# Patient Record
Sex: FEMALE | Race: WHITE | Employment: UNEMPLOYED | URBAN - METROPOLITAN AREA
[De-identification: names, ages, dates, MRNs, and addresses within clinical notes are randomized per-mention and may not be internally consistent; named-entity substitution may affect disease eponyms.]

---

## 2017-06-20 ENCOUNTER — OFFICE VISIT (OUTPATIENT)
Dept: FAMILY MEDICINE CLINIC | Age: 72
End: 2017-06-20

## 2017-06-20 VITALS
BODY MASS INDEX: 19.88 KG/M2 | DIASTOLIC BLOOD PRESSURE: 72 MMHG | HEART RATE: 60 BPM | WEIGHT: 115.8 LBS | SYSTOLIC BLOOD PRESSURE: 138 MMHG | OXYGEN SATURATION: 97 %

## 2017-06-20 DIAGNOSIS — L03.012 PARONYCHIA OF FINGER, LEFT: Primary | ICD-10-CM

## 2017-06-20 PROCEDURE — 99213 OFFICE O/P EST LOW 20 MIN: CPT | Performed by: PHYSICIAN ASSISTANT

## 2017-06-20 RX ORDER — CEPHALEXIN 500 MG/1
500 CAPSULE ORAL 4 TIMES DAILY
Qty: 28 CAPSULE | Refills: 0 | Status: SHIPPED | OUTPATIENT
Start: 2017-06-20 | End: 2017-06-27

## 2017-06-20 ASSESSMENT — ENCOUNTER SYMPTOMS: RESPIRATORY NEGATIVE: 1

## 2017-06-22 ENCOUNTER — TELEPHONE (OUTPATIENT)
Dept: FAMILY MEDICINE CLINIC | Age: 72
End: 2017-06-22

## 2017-06-23 ENCOUNTER — OFFICE VISIT (OUTPATIENT)
Dept: FAMILY MEDICINE CLINIC | Age: 72
End: 2017-06-23

## 2017-06-23 VITALS
SYSTOLIC BLOOD PRESSURE: 146 MMHG | OXYGEN SATURATION: 95 % | DIASTOLIC BLOOD PRESSURE: 80 MMHG | TEMPERATURE: 98 F | HEART RATE: 72 BPM

## 2017-06-23 DIAGNOSIS — L08.9 INFECTED FINGER: Primary | ICD-10-CM

## 2017-06-23 PROCEDURE — 99213 OFFICE O/P EST LOW 20 MIN: CPT | Performed by: INTERNAL MEDICINE

## 2017-06-23 ASSESSMENT — ENCOUNTER SYMPTOMS
BACK PAIN: 0
ALLERGIC/IMMUNOLOGIC NEGATIVE: 1
RESPIRATORY NEGATIVE: 1
COLOR CHANGE: 1

## 2017-06-26 ENCOUNTER — OFFICE VISIT (OUTPATIENT)
Dept: FAMILY MEDICINE CLINIC | Age: 72
End: 2017-06-26

## 2017-06-26 VITALS
SYSTOLIC BLOOD PRESSURE: 138 MMHG | HEART RATE: 61 BPM | RESPIRATION RATE: 18 BRPM | OXYGEN SATURATION: 95 % | BODY MASS INDEX: 18.61 KG/M2 | DIASTOLIC BLOOD PRESSURE: 70 MMHG | WEIGHT: 109 LBS | HEIGHT: 64 IN

## 2017-06-26 DIAGNOSIS — L08.9 INFECTED FINGER: Primary | ICD-10-CM

## 2017-06-26 PROCEDURE — 99213 OFFICE O/P EST LOW 20 MIN: CPT | Performed by: INTERNAL MEDICINE

## 2017-06-26 RX ORDER — LEVOTHYROXINE SODIUM 88 UG/1
TABLET ORAL
Qty: 90 TABLET | Refills: 1 | Status: SHIPPED | OUTPATIENT
Start: 2017-06-26 | End: 2017-12-21 | Stop reason: SDUPTHER

## 2017-06-26 RX ORDER — AMOXICILLIN AND CLAVULANATE POTASSIUM 875; 125 MG/1; MG/1
1 TABLET, FILM COATED ORAL 2 TIMES DAILY
Qty: 20 TABLET | Refills: 0 | Status: SHIPPED | OUTPATIENT
Start: 2017-06-26 | End: 2017-07-06

## 2017-06-26 ASSESSMENT — ENCOUNTER SYMPTOMS
RESPIRATORY NEGATIVE: 1
ALLERGIC/IMMUNOLOGIC NEGATIVE: 1
GASTROINTESTINAL NEGATIVE: 1

## 2017-06-28 ENCOUNTER — HOSPITAL ENCOUNTER (OUTPATIENT)
Dept: OCCUPATIONAL THERAPY | Age: 72
Discharge: HOME | End: 2017-06-30
Admitting: ORTHOPAEDIC SURGERY

## 2017-06-28 ENCOUNTER — OFFICE VISIT (OUTPATIENT)
Dept: ORTHOPEDIC SURGERY | Age: 72
End: 2017-06-28

## 2017-06-28 VITALS
SYSTOLIC BLOOD PRESSURE: 140 MMHG | BODY MASS INDEX: 21.09 KG/M2 | HEART RATE: 64 BPM | HEIGHT: 63 IN | DIASTOLIC BLOOD PRESSURE: 82 MMHG | WEIGHT: 119 LBS

## 2017-06-28 DIAGNOSIS — M79.645 PAIN OF FINGER OF LEFT HAND: Primary | ICD-10-CM

## 2017-06-28 DIAGNOSIS — L03.012 PARONYCHIA OF FINGER, LEFT: ICD-10-CM

## 2017-06-28 PROCEDURE — 73140 X-RAY EXAM OF FINGER(S): CPT | Performed by: ORTHOPAEDIC SURGERY

## 2017-06-28 PROCEDURE — 99203 OFFICE O/P NEW LOW 30 MIN: CPT | Performed by: ORTHOPAEDIC SURGERY

## 2017-06-28 RX ORDER — IBUPROFEN 600 MG/1
600 TABLET ORAL EVERY 6 HOURS PRN
COMMUNITY
End: 2022-01-04 | Stop reason: ALTCHOICE

## 2017-07-06 ENCOUNTER — OFFICE VISIT (OUTPATIENT)
Dept: ORTHOPEDIC SURGERY | Age: 72
End: 2017-07-06

## 2017-07-06 DIAGNOSIS — L08.9 INFECTED FINGER: Primary | ICD-10-CM

## 2017-07-06 PROCEDURE — 99212 OFFICE O/P EST SF 10 MIN: CPT | Performed by: ORTHOPAEDIC SURGERY

## 2017-07-07 ENCOUNTER — TELEPHONE (OUTPATIENT)
Dept: OTHER | Facility: CLINIC | Age: 72
End: 2017-07-07

## 2017-12-21 RX ORDER — LEVOTHYROXINE SODIUM 88 UG/1
TABLET ORAL
Qty: 90 TABLET | Refills: 1 | Status: SHIPPED | OUTPATIENT
Start: 2017-12-21 | End: 2018-06-16 | Stop reason: SDUPTHER

## 2018-06-16 ENCOUNTER — TELEPHONE (OUTPATIENT)
Dept: FAMILY MEDICINE CLINIC | Age: 73
End: 2018-06-16

## 2018-06-18 RX ORDER — LEVOTHYROXINE SODIUM 88 UG/1
88 TABLET ORAL DAILY
Qty: 90 TABLET | Refills: 0 | Status: SHIPPED | OUTPATIENT
Start: 2018-06-18 | End: 2018-09-13 | Stop reason: SDUPTHER

## 2018-07-10 ENCOUNTER — OFFICE VISIT (OUTPATIENT)
Dept: FAMILY MEDICINE CLINIC | Age: 73
End: 2018-07-10

## 2018-07-10 VITALS
SYSTOLIC BLOOD PRESSURE: 122 MMHG | OXYGEN SATURATION: 98 % | WEIGHT: 118.2 LBS | HEIGHT: 63 IN | HEART RATE: 68 BPM | DIASTOLIC BLOOD PRESSURE: 68 MMHG | BODY MASS INDEX: 20.94 KG/M2

## 2018-07-10 DIAGNOSIS — J30.1 CHRONIC SEASONAL ALLERGIC RHINITIS DUE TO POLLEN: ICD-10-CM

## 2018-07-10 DIAGNOSIS — M19.041 PRIMARY OSTEOARTHRITIS OF BOTH HANDS: ICD-10-CM

## 2018-07-10 DIAGNOSIS — M19.042 PRIMARY OSTEOARTHRITIS OF BOTH HANDS: ICD-10-CM

## 2018-07-10 DIAGNOSIS — E03.9 ACQUIRED HYPOTHYROIDISM: ICD-10-CM

## 2018-07-10 LAB
T4 FREE: 1.6 NG/DL (ref 0.9–1.8)
TSH SERPL DL<=0.05 MIU/L-ACNC: 0.19 UIU/ML (ref 0.27–4.2)

## 2018-07-10 PROCEDURE — 36415 COLL VENOUS BLD VENIPUNCTURE: CPT | Performed by: INTERNAL MEDICINE

## 2018-07-10 PROCEDURE — 99214 OFFICE O/P EST MOD 30 MIN: CPT | Performed by: INTERNAL MEDICINE

## 2018-07-10 ASSESSMENT — ENCOUNTER SYMPTOMS
ALLERGIC/IMMUNOLOGIC NEGATIVE: 1
GASTROINTESTINAL NEGATIVE: 1
RESPIRATORY NEGATIVE: 1

## 2018-07-10 ASSESSMENT — PATIENT HEALTH QUESTIONNAIRE - PHQ9
SUM OF ALL RESPONSES TO PHQ QUESTIONS 1-9: 0
1. LITTLE INTEREST OR PLEASURE IN DOING THINGS: 0
2. FEELING DOWN, DEPRESSED OR HOPELESS: 0
SUM OF ALL RESPONSES TO PHQ9 QUESTIONS 1 & 2: 0

## 2018-07-10 NOTE — PATIENT INSTRUCTIONS
All above problems reviewed and the found to be unchanged except for the following :     Acquired Hypothyroidism. Will do labs and adjust meds as needed. Call if new c/o. Seasonal Allergies. Claritin, Neti pot, Flonase as needed. Call if new c/o. Osteoarthritis. OTCs as needed. Continue to use hands and improve . Call if worsening pain. Medicare wellness visit in 6 mo.

## 2018-07-10 NOTE — ASSESSMENT & PLAN NOTE
No increased fatigue,wt loss/gain,hair loss, dry skin, depression, muscle pain, or heart palpatations. No change in med dose since last visit. No c/o w/ meds. On 88 mcg.

## 2018-07-17 RX ORDER — LEVOTHYROXINE SODIUM 0.07 MG/1
TABLET ORAL
Qty: 15 TABLET | Refills: 5 | Status: SHIPPED | OUTPATIENT
Start: 2018-07-17 | End: 2018-09-19 | Stop reason: SDUPTHER

## 2018-07-17 NOTE — TELEPHONE ENCOUNTER
Joanie King  Female, 67 y.o., 1945  Weight:   118 lb 3.2 oz (53.6 kg)  Phone:   [phone] (Home Phone) [phone] (Mobile)  Pref Phone:   None  PCP:   Melany Malhotra MD  Allergies  No Known Allergies  Health Maintenance:   Due  FYI  General  [facility]  MRN:   [id number]  MyChart: Active  Next Appt:   01/15/2019  Pt Comm Pref:   MyChart [2]            Associated Results     TSH without Reflex   Order: [id number]   Status:  Final result   Visible to patient:  Yes (MyChart) Next appt:  01/15/2019 at 10:00 AM in Family Medicine (Melany Malhotra MD) Dx:  Acquired hypothyroidism   Notes recorded by Anais Abernathy RN on 7/17/2018 at 8:13 AM EDT  Spoke with pt about her results/ needs the 75mcg sent to pharmacy and will re-check in 6 weeks  ------    Notes recorded by Lucina Green on 7/13/2018 at 10:24 AM EDT  LM for the pt to contact the office. ------    Notes recorded by Lucina Green on 7/12/2018 at 12:08 PM EDT  LM for the pt to contact the office. ------    Notes recorded by Melany Malhotra MD on 7/11/2018 at 9:17 PM EDT  Thyroid in upper level of normal. To alt 75/88 mcg for 6 weeks and rechx TSH and Free T4.      Ref Range & Units 7d ago 1yr ago 2yr ago 4yr ago    TSH 0.27 - 4.20 uIU/mL 0.19   0.77  1.62  1.48R    Narrative     Performed at:  [place] [address] [address] [facility] [address]   Phone [phone]      Specimen Collected: 07/10/18 09:00 Last Resulted: 07/10/18 18:52 Lab Flowsheet Order Details View Encounter Lab and Collection Details Routing Result History      R=Reference range differs from displayed range              T4, Free   Order: [id number]   Status:  Final result   Visible to patient:  Yes (MyChart) Next appt:  01/15/2019 at 10:00 AM in [address] [place] [address] (Melany Malhotra MD) Dx:  Acquired hypothyroidism   Notes recorded by Anais Abernathy RN on 7/17/2018 at 8:13 AM EDT  Spoke with pt about her results/ needs the [facility] sent to pharmacy and will re-check in 6 weeks  ------    Notes recorded by Arcelia Rico on 2018 at 10:24 AM EDT  LM for the pt to contact the office. ------    Notes recorded by Arcelia Rico on 2018 at 12:08 PM EDT  LM for the pt to contact the office. ------    Notes recorded by Obey Pozo MD on 2018 at 9:17 PM EDT  Thyroid in upper level of normal. To alt 75/88 mcg for 6 weeks and rechx TSH and Free T4.      Ref Range & Units 7d ago 2yr ago    T4 Free 0.9 - 1.8 ng/dL 1.6  1.3    Narrative     Performed at:  [address] [facility] [address]   Phone [phone]      Specimen Collected: 07/10/18 09:00 Last Resulted: 07/10/18 18:25 Lab Flowsheet Order Details View Encounter Lab and Collection Details Routing Result History               Routing History     Priority Sent On From To Message Type    2018  8:13 AM SOLE Ricks LPN Result Notes     11:17 AM Annette Rogers LPN [facility] [facility] Result Notes    2018  9:17 PM MD Annette Haji LPN Result Notes    1406  6:25 PM Otto, Swoh Incoming Results Sol Salazar MD Results   Future Appointments      Provider Maverick Blanca   1/15/2019 10:00 AM Obey Pzoo MD [address]   Patient Demographics     Patient Name  Elis Wang Sex  Female   1945 [facility]   Address  [address] [facility] Phone  [phone] (Home)  [phone] [facility])   Emergency Contact(s)     Name Relation Home Work Mobile   Alcides delcid Spouse [phone]   Alena Easley Child [phone]     PCP and Center     Primary Care Provider [address] [place] [address] [address]   Patient Employment     Status   Not Employed

## 2018-08-24 ENCOUNTER — TELEPHONE (OUTPATIENT)
Dept: FAMILY MEDICINE CLINIC | Age: 73
End: 2018-08-24

## 2018-08-24 DIAGNOSIS — E03.9 ACQUIRED HYPOTHYROIDISM: Primary | ICD-10-CM

## 2018-08-24 NOTE — TELEPHONE ENCOUNTER
Patient will be going to the [facility] on [address] for her repeat TSH blood work on Monday or Tuesday next week. Orders please.

## 2018-08-28 DIAGNOSIS — E03.9 ACQUIRED HYPOTHYROIDISM: ICD-10-CM

## 2018-08-28 LAB — TSH SERPL DL<=0.05 MIU/L-ACNC: 0.26 UIU/ML (ref 0.27–4.2)

## 2018-08-30 ENCOUNTER — TELEPHONE (OUTPATIENT)
Dept: FAMILY MEDICINE CLINIC | Age: 73
End: 2018-08-30

## 2018-08-30 DIAGNOSIS — E03.9 ACQUIRED HYPOTHYROIDISM: Primary | ICD-10-CM

## 2018-08-30 NOTE — TELEPHONE ENCOUNTER
----- Message from Pillo Prince sent at 8/30/2018 11:22 AM EDT -----  Patient returned call regarding TSH lab results. I read her the physician notes, she understood, no questions. She will go to the [facility] on [address] in 6 - 8 weeks for a recheck. Orders please.

## 2018-09-13 RX ORDER — LEVOTHYROXINE SODIUM 88 UG/1
88 TABLET ORAL DAILY
Qty: 90 TABLET | Refills: 1 | Status: SHIPPED | OUTPATIENT
Start: 2018-09-13 | End: 2018-09-19

## 2018-09-19 RX ORDER — LEVOTHYROXINE SODIUM 0.07 MG/1
75 TABLET ORAL DAILY
Qty: 30 TABLET | Refills: 0 | Status: SHIPPED | OUTPATIENT
Start: 2018-09-19 | End: 2018-09-24 | Stop reason: SDUPTHER

## 2018-09-19 NOTE — TELEPHONE ENCOUNTER
Patient requesting new Rx for new dose of Levothyroxine (75mcg once a day).     Cayla Ashton [phone] (home)    is requesting refill(s) of medication Levothyroxine 75mcg to preferred pharmacy [facility]    Last OV 7/10/18 (pertaining to medication)   Last refill 9/13/18 old dose (per medication requested)  Next office visit scheduled or attempted yes  Date 1/15/19  If No, reason     Call patient when done

## 2018-09-24 RX ORDER — LEVOTHYROXINE SODIUM 0.07 MG/1
75 TABLET ORAL DAILY
Qty: 30 TABLET | Refills: 5 | Status: SHIPPED | OUTPATIENT
Start: 2018-09-24 | End: 2019-03-19 | Stop reason: SDUPTHER

## 2018-09-24 NOTE — TELEPHONE ENCOUNTER
Patients  Chikis Saucedo called to let Dr. Sreekanth Hatch know that they need a new prescription for the Levothyroxine sent to [facility] on [address] stating that Mame Osorio is only to be taking the 75 MCG everyday. She should NOT alternate to 88 MCG every other day. Please send new prescription when available.

## 2018-10-22 DIAGNOSIS — E03.9 ACQUIRED HYPOTHYROIDISM: ICD-10-CM

## 2018-10-23 LAB — TSH SERPL DL<=0.05 MIU/L-ACNC: 0.66 UIU/ML (ref 0.27–4.2)

## 2019-01-15 ENCOUNTER — OFFICE VISIT (OUTPATIENT)
Dept: FAMILY MEDICINE CLINIC | Age: 74
End: 2019-01-15
Payer: MEDICARE

## 2019-01-15 VITALS
HEIGHT: 64 IN | RESPIRATION RATE: 16 BRPM | WEIGHT: 120 LBS | OXYGEN SATURATION: 95 % | DIASTOLIC BLOOD PRESSURE: 60 MMHG | BODY MASS INDEX: 20.49 KG/M2 | SYSTOLIC BLOOD PRESSURE: 120 MMHG | HEART RATE: 65 BPM

## 2019-01-15 DIAGNOSIS — Z00.00 ROUTINE GENERAL MEDICAL EXAMINATION AT A HEALTH CARE FACILITY: ICD-10-CM

## 2019-01-15 DIAGNOSIS — R53.83 FATIGUE, UNSPECIFIED TYPE: ICD-10-CM

## 2019-01-15 DIAGNOSIS — Z00.00 MEDICARE ANNUAL WELLNESS VISIT, SUBSEQUENT: Primary | ICD-10-CM

## 2019-01-15 DIAGNOSIS — E78.00 PURE HYPERCHOLESTEROLEMIA: ICD-10-CM

## 2019-01-15 DIAGNOSIS — M19.042 PRIMARY OSTEOARTHRITIS OF BOTH HANDS: ICD-10-CM

## 2019-01-15 DIAGNOSIS — M19.041 PRIMARY OSTEOARTHRITIS OF BOTH HANDS: ICD-10-CM

## 2019-01-15 DIAGNOSIS — E03.9 ACQUIRED HYPOTHYROIDISM: ICD-10-CM

## 2019-01-15 DIAGNOSIS — M85.89 OSTEOPENIA OF MULTIPLE SITES: ICD-10-CM

## 2019-01-15 LAB
ALBUMIN SERPL-MCNC: 4.6 G/DL (ref 3.4–5)
ALP BLD-CCNC: 57 U/L (ref 40–129)
ALT SERPL-CCNC: 21 U/L (ref 10–40)
ANION GAP SERPL CALCULATED.3IONS-SCNC: 12 MMOL/L (ref 3–16)
AST SERPL-CCNC: 21 U/L (ref 15–37)
BILIRUB SERPL-MCNC: 0.5 MG/DL (ref 0–1)
BILIRUBIN DIRECT: <0.2 MG/DL (ref 0–0.3)
BILIRUBIN, INDIRECT: NORMAL MG/DL (ref 0–1)
BUN BLDV-MCNC: 11 MG/DL (ref 7–20)
CALCIUM SERPL-MCNC: 9.5 MG/DL (ref 8.3–10.6)
CHLORIDE BLD-SCNC: 101 MMOL/L (ref 99–110)
CHOLESTEROL, TOTAL: 200 MG/DL (ref 0–199)
CO2: 27 MMOL/L (ref 21–32)
CREAT SERPL-MCNC: 0.5 MG/DL (ref 0.6–1.2)
GFR AFRICAN AMERICAN: >60
GFR NON-AFRICAN AMERICAN: >60
GLUCOSE BLD-MCNC: 89 MG/DL (ref 70–99)
HCT VFR BLD CALC: 43.8 % (ref 36–48)
HDLC SERPL-MCNC: 82 MG/DL (ref 40–60)
HEMOGLOBIN: 14.9 G/DL (ref 12–16)
LDL CHOLESTEROL CALCULATED: 107 MG/DL
MCH RBC QN AUTO: 33.7 PG (ref 26–34)
MCHC RBC AUTO-ENTMCNC: 33.9 G/DL (ref 31–36)
MCV RBC AUTO: 99.5 FL (ref 80–100)
PDW BLD-RTO: 13.4 % (ref 12.4–15.4)
PHOSPHORUS: 4.3 MG/DL (ref 2.5–4.9)
PLATELET # BLD: 336 K/UL (ref 135–450)
PMV BLD AUTO: 8.3 FL (ref 5–10.5)
POTASSIUM SERPL-SCNC: 4.3 MMOL/L (ref 3.5–5.1)
RBC # BLD: 4.41 M/UL (ref 4–5.2)
SODIUM BLD-SCNC: 140 MMOL/L (ref 136–145)
TOTAL PROTEIN: 7.4 G/DL (ref 6.4–8.2)
TRIGL SERPL-MCNC: 57 MG/DL (ref 0–150)
TSH SERPL DL<=0.05 MIU/L-ACNC: 0.71 UIU/ML (ref 0.27–4.2)
VLDLC SERPL CALC-MCNC: 11 MG/DL
WBC # BLD: 4.9 K/UL (ref 4–11)

## 2019-01-15 PROCEDURE — G0439 PPPS, SUBSEQ VISIT: HCPCS | Performed by: INTERNAL MEDICINE

## 2019-01-15 PROCEDURE — 36415 COLL VENOUS BLD VENIPUNCTURE: CPT | Performed by: INTERNAL MEDICINE

## 2019-01-15 ASSESSMENT — LIFESTYLE VARIABLES
HOW OFTEN DO YOU HAVE SIX OR MORE DRINKS ON ONE OCCASION: 0
HOW MANY STANDARD DRINKS CONTAINING ALCOHOL DO YOU HAVE ON A TYPICAL DAY: 2
HOW OFTEN DO YOU HAVE A DRINK CONTAINING ALCOHOL: 4
AUDIT-C TOTAL SCORE: 6

## 2019-01-15 ASSESSMENT — PATIENT HEALTH QUESTIONNAIRE - PHQ9
SUM OF ALL RESPONSES TO PHQ QUESTIONS 1-9: 0
SUM OF ALL RESPONSES TO PHQ QUESTIONS 1-9: 0

## 2019-01-15 ASSESSMENT — ENCOUNTER SYMPTOMS
RESPIRATORY NEGATIVE: 1
EYES NEGATIVE: 1
GASTROINTESTINAL NEGATIVE: 1
ALLERGIC/IMMUNOLOGIC NEGATIVE: 1

## 2019-01-15 ASSESSMENT — ANXIETY QUESTIONNAIRES: GAD7 TOTAL SCORE: 0

## 2019-03-19 RX ORDER — LEVOTHYROXINE SODIUM 0.07 MG/1
75 TABLET ORAL DAILY
Qty: 90 TABLET | Refills: 3 | Status: SHIPPED | OUTPATIENT
Start: 2019-03-19 | End: 2020-03-05

## 2019-12-11 ENCOUNTER — TELEPHONE (OUTPATIENT)
Dept: FAMILY MEDICINE CLINIC | Age: 74
End: 2019-12-11

## 2019-12-11 RX ORDER — METHYLPREDNISOLONE 4 MG/1
TABLET ORAL
Qty: 1 KIT | Refills: 0 | Status: SHIPPED | OUTPATIENT
Start: 2019-12-11 | End: 2020-06-01 | Stop reason: SDUPTHER

## 2020-03-12 ENCOUNTER — OFFICE VISIT (OUTPATIENT)
Dept: FAMILY MEDICINE CLINIC | Age: 75
End: 2020-03-12
Payer: MEDICARE

## 2020-03-12 VITALS
DIASTOLIC BLOOD PRESSURE: 70 MMHG | WEIGHT: 119 LBS | HEART RATE: 64 BPM | OXYGEN SATURATION: 98 % | SYSTOLIC BLOOD PRESSURE: 120 MMHG | BODY MASS INDEX: 20.32 KG/M2 | RESPIRATION RATE: 16 BRPM | HEIGHT: 64 IN

## 2020-03-12 LAB — TSH SERPL DL<=0.05 MIU/L-ACNC: 0.89 UIU/ML (ref 0.27–4.2)

## 2020-03-12 PROCEDURE — 99213 OFFICE O/P EST LOW 20 MIN: CPT | Performed by: INTERNAL MEDICINE

## 2020-03-12 PROCEDURE — 36415 COLL VENOUS BLD VENIPUNCTURE: CPT | Performed by: INTERNAL MEDICINE

## 2020-03-12 ASSESSMENT — PATIENT HEALTH QUESTIONNAIRE - PHQ9
SUM OF ALL RESPONSES TO PHQ QUESTIONS 1-9: 0
SUM OF ALL RESPONSES TO PHQ QUESTIONS 1-9: 0
1. LITTLE INTEREST OR PLEASURE IN DOING THINGS: 0
2. FEELING DOWN, DEPRESSED OR HOPELESS: 0
SUM OF ALL RESPONSES TO PHQ9 QUESTIONS 1 & 2: 0

## 2020-03-12 ASSESSMENT — ENCOUNTER SYMPTOMS
ALLERGIC/IMMUNOLOGIC NEGATIVE: 1
GASTROINTESTINAL NEGATIVE: 1
RESPIRATORY NEGATIVE: 1

## 2020-03-12 NOTE — PROGRESS NOTES
Head: Normocephalic and atraumatic. Eyes:      Extraocular Movements: Extraocular movements intact. Conjunctiva/sclera: Conjunctivae normal.      Pupils: Pupils are equal, round, and reactive to light. Neck:      Musculoskeletal: Full passive range of motion without pain, normal range of motion and neck supple. Normal range of motion. No edema, erythema, neck rigidity, spinous process tenderness or muscular tenderness. Thyroid: No thyroid mass or thyromegaly. Vascular: Normal carotid pulses. No carotid bruit, hepatojugular reflux or JVD. Trachea: Trachea normal. No tracheal deviation. Cardiovascular:      Rate and Rhythm: Normal rate and regular rhythm. No extrasystoles are present. Chest Wall: PMI is not displaced. Pulses: Normal pulses. No decreased pulses. Carotid pulses are 2+ on the right side and 2+ on the left side. Radial pulses are 2+ on the right side and 2+ on the left side. Femoral pulses are 2+ on the right side and 2+ on the left side. Popliteal pulses are 2+ on the right side and 2+ on the left side. Dorsalis pedis pulses are 2+ on the right side and 2+ on the left side. Posterior tibial pulses are 2+ on the right side and 2+ on the left side. Heart sounds: Normal heart sounds, S1 normal and S2 normal. Heart sounds not distant. No murmur. No friction rub. No gallop. No S3 or S4 sounds. Pulmonary:      Effort: Pulmonary effort is normal. No tachypnea, bradypnea, accessory muscle usage or respiratory distress. Breath sounds: Normal breath sounds. No decreased breath sounds, wheezing, rhonchi or rales. Chest:      Chest wall: No tenderness. Musculoskeletal: Normal range of motion. General: No swelling, tenderness, deformity or signs of injury. Right lower leg: No edema. Left lower leg: No edema. Lymphadenopathy:      Cervical: No cervical adenopathy.    Skin:     General: Skin is warm and

## 2020-03-20 ENCOUNTER — TELEPHONE (OUTPATIENT)
Dept: FAMILY MEDICINE CLINIC | Age: 75
End: 2020-03-20

## 2020-03-20 RX ORDER — LEVOTHYROXINE SODIUM 0.07 MG/1
75 TABLET ORAL DAILY
Qty: 30 TABLET | Refills: 5 | Status: SHIPPED | OUTPATIENT
Start: 2020-03-20 | End: 2020-06-09 | Stop reason: ALTCHOICE

## 2020-06-01 ENCOUNTER — OFFICE VISIT (OUTPATIENT)
Dept: FAMILY MEDICINE CLINIC | Age: 75
End: 2020-06-01
Payer: MEDICARE

## 2020-06-01 VITALS
HEART RATE: 60 BPM | HEIGHT: 64 IN | SYSTOLIC BLOOD PRESSURE: 118 MMHG | WEIGHT: 123.6 LBS | BODY MASS INDEX: 21.1 KG/M2 | OXYGEN SATURATION: 98 % | TEMPERATURE: 98 F | DIASTOLIC BLOOD PRESSURE: 82 MMHG

## 2020-06-01 PROCEDURE — 99213 OFFICE O/P EST LOW 20 MIN: CPT | Performed by: PHYSICIAN ASSISTANT

## 2020-06-01 RX ORDER — METHYLPREDNISOLONE 4 MG/1
TABLET ORAL
Qty: 1 KIT | Refills: 0 | Status: SHIPPED | OUTPATIENT
Start: 2020-06-01 | End: 2021-05-14 | Stop reason: SDUPTHER

## 2020-06-01 RX ORDER — LEVOTHYROXINE SODIUM 75 UG/1
CAPSULE ORAL DAILY
COMMUNITY
End: 2021-03-16 | Stop reason: SDUPTHER

## 2020-06-01 ASSESSMENT — ENCOUNTER SYMPTOMS: RESPIRATORY NEGATIVE: 1

## 2020-06-01 NOTE — PROGRESS NOTES
Subjective:      Patient ID: Yusuf Erickson is a 76 y.o. female. HPI  Patient presents with right sided low back pain with radiation down the right leg. Has had an episode in December and it resolved. This current episode started about a month ago after a few days of working in the yard. Has been taking advil 400 every 6 hours. Standing or walking makes it worse. Better if sitting. Review of Systems   Constitutional: Negative. Respiratory: Negative. Cardiovascular: Negative. Musculoskeletal:        Right leg pain and tingling/burning sensation       Objective:   Physical Exam  Constitutional:       Appearance: Normal appearance. Cardiovascular:      Rate and Rhythm: Normal rate and regular rhythm. Pulses: Normal pulses. Heart sounds: Normal heart sounds. Musculoskeletal: Normal range of motion. Neurological:      Mental Status: She is alert. Motor: No weakness. Deep Tendon Reflexes: Reflexes normal.         Assessment:       Diagnosis Orders   1. Sciatica of right side             Plan: Will start medrol dose pack. Patient is to call if no improvement or signs and symptoms worsen.           SAJAN Holloway

## 2020-06-09 ENCOUNTER — OFFICE VISIT (OUTPATIENT)
Dept: FAMILY MEDICINE CLINIC | Age: 75
End: 2020-06-09
Payer: MEDICARE

## 2020-06-09 VITALS
HEART RATE: 61 BPM | TEMPERATURE: 97.7 F | RESPIRATION RATE: 16 BRPM | OXYGEN SATURATION: 99 % | HEIGHT: 64 IN | WEIGHT: 122.6 LBS | SYSTOLIC BLOOD PRESSURE: 142 MMHG | DIASTOLIC BLOOD PRESSURE: 78 MMHG | BODY MASS INDEX: 20.93 KG/M2

## 2020-06-09 PROCEDURE — 99213 OFFICE O/P EST LOW 20 MIN: CPT | Performed by: INTERNAL MEDICINE

## 2020-06-09 RX ORDER — PREDNISONE 10 MG/1
TABLET ORAL
Qty: 10 TABLET | Refills: 0 | Status: SHIPPED | OUTPATIENT
Start: 2020-06-09 | End: 2021-03-16 | Stop reason: ALTCHOICE

## 2020-06-09 RX ORDER — CYCLOBENZAPRINE HCL 10 MG
10 TABLET ORAL 3 TIMES DAILY PRN
Qty: 21 TABLET | Refills: 0 | Status: SHIPPED | OUTPATIENT
Start: 2020-06-09 | End: 2020-06-19

## 2020-06-09 ASSESSMENT — ENCOUNTER SYMPTOMS
GASTROINTESTINAL NEGATIVE: 1
BACK PAIN: 1
RESPIRATORY NEGATIVE: 1
ALLERGIC/IMMUNOLOGIC NEGATIVE: 1

## 2020-06-09 NOTE — ASSESSMENT & PLAN NOTE
Sciatica R side not improving w/ Stretching and meds. Has pseudoclaudication w/ walking. Loss or R ankle reflex. Started after working in yard.

## 2020-06-09 NOTE — PROGRESS NOTES
Subjective:      Patient ID: Stephan Kirby is a 76 y.o. female. HPI  Sciatica of right side  Sciatica R side not improving w/ Stretching and meds. Has pseudoclaudication w/ walking. Loss or R ankle reflex. Started after working in yard. Past Medical History:   Diagnosis Date    Hypothyroidism      Current Outpatient Medications   Medication Sig Dispense Refill    predniSONE (DELTASONE) 10 MG tablet 3 pills a day for 3 days, 2 pills a day for 3 days, 1 pill a day for 3 days. 10 tablet 0    cyclobenzaprine (FLEXERIL) 10 MG tablet Take 1 tablet by mouth 3 times daily as needed for Muscle spasms 21 tablet 0    Levothyroxine Sodium 75 MCG CAPS Take by mouth Daily      ibuprofen (ADVIL;MOTRIN) 600 MG tablet Take 600 mg by mouth every 6 hours as needed for Pain       No current facility-administered medications for this visit. No Known Allergies  Social History     Tobacco Use    Smoking status: Never Smoker    Smokeless tobacco: Never Used   Substance Use Topics    Alcohol use: Yes     Family History   Problem Relation Age of Onset    Cancer Mother         breast    Diabetes Father     Heart Disease Father         chf       Review of Systems   Constitutional: Negative. Respiratory: Negative. Cardiovascular: Negative. Gastrointestinal: Negative. Endocrine: Negative. Genitourinary: Negative. Musculoskeletal: Positive for back pain. Skin: Negative. Allergic/Immunologic: Negative. Neurological: Negative. Hematological: Negative. Objective:   Physical Exam  Constitutional:       General: She is not in acute distress. Appearance: She is well-developed. She is not ill-appearing, toxic-appearing or diaphoretic. HENT:      Head: Normocephalic and atraumatic. Eyes:      Conjunctiva/sclera: Conjunctivae normal.      Pupils: Pupils are equal, round, and reactive to light.    Neck:      Musculoskeletal: Full passive range of motion without pain, normal range of motion

## 2020-06-10 ENCOUNTER — TELEPHONE (OUTPATIENT)
Dept: ADMINISTRATIVE | Age: 75
End: 2020-06-10

## 2020-06-10 NOTE — TELEPHONE ENCOUNTER
Submitted PA for Cyclobenzaprine HCl 10MG tablets, Key: [id number]. Medication has been APPROVED through 06/10/2021. Please notify patient. Thank you.

## 2020-06-11 ENCOUNTER — TELEPHONE (OUTPATIENT)
Dept: FAMILY MEDICINE CLINIC | Age: 75
End: 2020-06-11

## 2020-06-15 ENCOUNTER — HOSPITAL ENCOUNTER (OUTPATIENT)
Dept: PHYSICAL THERAPY | Age: 75
Setting detail: THERAPIES SERIES
Discharge: HOME | End: 2020-06-15
Payer: MEDICARE

## 2020-06-15 PROCEDURE — 97110 THERAPEUTIC EXERCISES: CPT

## 2020-06-15 PROCEDURE — 97161 PT EVAL LOW COMPLEX 20 MIN: CPT

## 2020-06-15 NOTE — FLOWSHEET NOTE
with pain, L SB 25 deg, R SB 20 deg     Strength Other  Other: B hip flexion 5/5, B knees 5/5, B DF 5/5, L hip abd 5/5, R hip abd 4/5, able to heel and toe walk  Additional Measures  Flexibility: L 90-90 HS (-) 15 deg, R (-) 30   Special Tests: (+) SLR R, (-) L  Other: decreased R ankle reflex 1+ vs L 2+, LEFS = 35% impaired  Sensation  Overall Sensation Status: (intact to light touch)    Exercises, Neuro Facilitation & Gait Training (60272, [phone], [phone]): Activity Resistance/Repetitions Other comments   Supine HS stretch with strap 10-15\" x 3 R    Sitting piriformis fig 4 stretch, push knee down, lean fwd 10-15\" x 3 each    TrA isometric 5\" x 10    Sciatic n glides R  add    SKT opp shoulder stretch Add    TrA with march add                                              Therapeutic Activities (34120):  NA    Home Exercise Program:   Pt. demonstrated good understanding and knowledge of HEP. Written instructions provided.     06/15/2020: HS stretch with strap supine, sitting piriformis stretch, TrA isometric    Manual Treatments (41998): caution due to osteopenia     Modalities:  Add prn    Timed Code Treatment Minutes:  TE: 24    Total Treatment Minutes:  46 (+Eval low)    Treatment/Activity Tolerance:  [x] Patient tolerated treatment well [] Patient limited by fatigue  [] Patient limited by pain  [] Patient limited by other medical complications  [] Other:     Assessment: Pt presents with pain, decreased ROM/flexibility/strength limiting N standing, walking, kneeling, sitting, yard work/gardening    Prognosis: [x] Good [] Fair  [] Poor    Patient Requires Follow-up: [x] Yes  [] No    Goals:  Short term goals  Time Frame for Short term goals: 3 weeks  Short term goal 1: Pt will demo good understanding and knowledge of initial HEP  Short term goal 2: Decreased pain 3/10 at worst to allow for improved tolerance to prolonged sitting  Long term goals  Time Frame for Long term goals : 5 weeks  Long term goal 1: Pt will

## 2020-06-15 NOTE — PLAN OF CARE
[x] Modalities (For modulating pain/tenderness/paresthesias, reducing swelling/inflammation/tightness, improving soft tissue extensibility, and/or to increase muscle tone/strength):     [] Ultrasound  [] Electrical Stimulation        [] Cervical Traction [] Lumbar Traction       [] Cold/hotpack [] Iontophoresis   Other:      []          []      Assessment:  Conditions Requiring Skilled Therapeutic Intervention  Body structures, Functions, Activity limitations: Decreased functional mobility , Decreased ROM, Decreased strength, Increased pain  Assessment: Pt presents with pain, decreased ROM/flexibility/strength limiting N standing, walking, kneeling, sitting, yard work/gardening  Treatment Diagnosis: R LE pain  Prognosis: Good  Decision Making: Low Complexity  REQUIRES PT FOLLOW UP: Yes    Goals:  Short term goals  Time Frame for Short term goals: 3 weeks  Short term goal 1: Pt will demo good understanding and knowledge of initial HEP  Short term goal 2: Decreased pain 3/10 at worst to allow for improved tolerance to prolonged sitting  Long term goals  Time Frame for Long term goals : 5 weeks  Long term goal 1: Pt will demo good understanding and knowledge of HEP progressions  Long term goal 2: Decreased pain 1/10 at worst with rare paresthesias to allow for sitting tolerance as previous  Long term goal 3: Trunk AROM WFLs, R hip PROM = L and R HS 90-90 = L without pain to allow for N gait pattern with tolerance as previous without increased pain  Long term goal 4: Strength R hip abd 5/5 to allow for prolonged standing, yard work/gardening tasks as previous without increased pain  Long term goal 5: Decreased impairment per LEFS <10% impaired    Frequency/Duration:  # Days per week: [x] 1 day # Weeks: [] 1 week [x] 5 weeks     [x] 2 days   [] 2 weeks [] 6 weeks     [] 3 days   [] 3 weeks [] 7 weeks     [] 4 days   [] 4 weeks [] 8 weeks    Rehab Potential: [] Excellent [x] Good [] Fair  [] Poor       Electronically

## 2020-06-15 NOTE — PROGRESS NOTES
The Lower Extremity Functional Scale    Patient: Derrell Carbone  : 1945  MRN: [id number]  Date: 6/15/2020  Electronically Signed by: Deniz Miller PT, DPT [id number]     We are interested in knowing whether you are having any difficulty at all with the activities listed below because of your lower limb problem for which you are currently seeking attention. Please provide an answer for each activity.          Today would you have difficulty at all with:    Activities Extreme Difficulty or Unable to Perform Activity Quite a Bit of Difficulty Moderate Difficulty A Little Bit of Difficulty No Difficulty   1 Any of your usual work, housework, or school activities []0 []1 [x]2  []3 []4   2 Your usual hobbies, recreational, or sporting activities []0 []1 [x]2 []3 []4   3 Getting into or out of the bath []0 []1 []2 []3 [x]4   4 Walking between rooms []0 []1 []2 []3 [x]4   5 Putting on your shoes or socks []0 []1 []2 [x]3 []4   6 Squatting []0 []1 []2 []3 [x]4   7 Lifting an object, like a bag of groceries from the floor []0 []1 []2 [x]3 []4   8 Performing light activities around your home []0 []1 []2 []3 [x]4   9 Performing heavy activities around your home []0 []1 [x]2 []3 []4   10 Getting into or out of a car []0 []1 []2 []3 [x]4   11 Walking 2 blocks []0 [x]1 []2 []3 []4   12 Walking a mile [x]0 []1 []2 []3 []4   13 Going up or down 10 stairs (about 1 flight of stairs) []0 []1 []2 []3 [x]4   14 Standing for 1 hour []0 [x]1 []2 []3 []4   15 Sitting for 1 hour []0 []1 []2 [x]3 []4   16 Running on even ground  []0 [x]1 []2 []3 []4   17 Running on uneven ground  []0 [x]1 []2 []3 []4   18 Making sharp turns while running fast  []0 []1 []2 []3 []4   19 Hopping []0 []1 []2 []3 []4   20 Rolling over in bed []0 []1 []2 []3 [x]4    Column Totals:          Patient Score from Above: 47/72    (Patient Score / 80) X 100:  65%                          Scoring Method for Lower Extremity Functional Scale    The Lower Extremity
basement  Active : Yes    Objective     Observation/Palpation  Palpation: mild tenderness R buttock  Observation: Pt ambulates with mild antalgia with short distances today    PROM RLE (degrees)  RLE General PROM: hip flexion WFLs, ER 40 deg, IR 30 deg  PROM LLE (degrees)  LLE General PROM: hip flexion WFLs, ER 45 deg, IR 40 deg  Spine  Lumbar: flexion WFLs with pain upon return, ext 25% with pain, L SB 25 deg, R SB 20 deg    Strength Other  Other: B hip flexion 5/5, B knees 5/5, B DF 5/5, L hip abd 5/5, R hip abd 4/5, able to heel and toe walk     Additional Measures  Flexibility: L 90-90 HS (-) 15 deg, R (-) 30   Special Tests: (+) SLR R, (-) L  Other: decreased R ankle reflex 1+ vs L 2+, LEFS = 35% impaired  Sensation  Overall Sensation Status: (intact to light touch)    Assessment   Conditions Requiring Skilled Therapeutic Intervention  Body structures, Functions, Activity limitations: Decreased functional mobility ; Decreased ROM; Decreased strength; Increased pain  Assessment: Pt presents with pain, decreased ROM/flexibility/strength limiting N standing, walking, kneeling, sitting, yard work/gardening  Treatment Diagnosis: R LE pain  Prognosis: Good  Decision Making: Low Complexity  REQUIRES PT FOLLOW UP: Yes  Activity Tolerance  Activity Tolerance: Patient Tolerated treatment well         Plan   Plan  Times per week: 1-2  Plan weeks: 5  Plan Comment: Plan of care initiated    Goals  Short term goals  Time Frame for Short term goals: 3 weeks  Short term goal 1: Pt will demo good understanding and knowledge of initial HEP  Short term goal 2: Decreased pain 3/10 at worst to allow for improved tolerance to prolonged sitting  Long term goals  Time Frame for Long term goals : 5 weeks  Long term goal 1: Pt will demo good understanding and knowledge of HEP progressions  Long term goal 2: Decreased pain 1/10 at worst with rare paresthesias to allow for sitting tolerance as previous  Long term goal 3: Trunk AROM

## 2020-06-22 ENCOUNTER — HOSPITAL ENCOUNTER (OUTPATIENT)
Dept: PHYSICAL THERAPY | Age: 75
Setting detail: THERAPIES SERIES
Discharge: HOME | End: 2020-06-22
Payer: MEDICARE

## 2020-06-22 PROCEDURE — 97530 THERAPEUTIC ACTIVITIES: CPT

## 2020-06-22 PROCEDURE — 97110 THERAPEUTIC EXERCISES: CPT

## 2020-06-22 NOTE — FLOWSHEET NOTE
Objective:06/15/2020   Observation:   Palpation: mild tenderness R buttock  Observation: Pt ambulates with mild antalgia with short distances today      Test measurements:     PROM RLE (degrees)  RLE General PROM: hip flexion WFLs, ER 40 deg, IR 30 deg  PROM LLE (degrees)  LLE General PROM: hip flexion WFLs, ER 45 deg, IR 40 deg  Spine  Lumbar: flexion WFLs with pain upon return, ext 25% with pain, L SB 25 deg, R SB 20 deg     Strength Other  Other: B hip flexion 5/5, B knees 5/5, B DF 5/5, L hip abd 5/5, R hip abd 4/5, able to heel and toe walk  Additional Measures  Flexibility: L 90-90 HS (-) 15 deg, R (-) 30   Special Tests: (+) SLR R, (-) L  Other: decreased R ankle reflex 1+ vs L 2+, LEFS = 35% impaired  Sensation  Overall Sensation Status: (intact to light touch)    Exercises, Neuro Facilitation & Gait Training (36429, [id number], [id number]): Activity Resistance/Repetitions Other comments   Supine HS stretch with strap 10-15\" x 3 R Cues for opp leg straight   Sitting piriformis fig 4 stretch, push knee down, lean fwd 10-15\" x 3 each    TrA isometric 5\" x 10    Sciatic n glides R  X 10 LAQ with ankle DF   SKT opp shoulder stretch 15\" x 3 each    TrA with march 3\" x 10 each                                              Therapeutic Activities (13353): Instructed pt in proper way for picking up items off the floor or a lower position and other similar tasks such as loading/unloading washer/dryer, loading/unloading groceries from trunk and proper way for using a sweeper. Pt verbalized and demo good understanding. Home Exercise Program:   Pt. demonstrated good understanding and knowledge of HEP. Written instructions provided. 06/15/2020: HS stretch with strap supine, sitting piriformis stretch, TrA isometric  06/22/2020:  TrA with march, sciatic n glides, SKT opp shoulder    Manual Treatments (34108): caution due to osteopenia     Modalities:  Add prn    Timed Code Treatment Minutes:  TE: 35, TA: 10    Total

## 2020-06-26 ENCOUNTER — HOSPITAL ENCOUNTER (OUTPATIENT)
Dept: PHYSICAL THERAPY | Age: 75
Setting detail: THERAPIES SERIES
Discharge: HOME | End: 2020-06-26
Payer: MEDICARE

## 2020-06-26 PROCEDURE — 97110 THERAPEUTIC EXERCISES: CPT

## 2020-06-26 NOTE — FLOWSHEET NOTE
Physical Therapy Daily Treatment Note  Date:  2020    Patient Name:  Salome Guevara    :  1945  MRN: [id number]  Restrictions/Precautions:  osteopenia   Medical/Treatment Diagnosis Information:  · Diagnosis: Right sided sciatica (M54.31 ICD-10-CM)  · Treatment Diagnosis: R LE pain  Insurance/Certification information:  PT Insurance Information: BCBS [place], [place] required, $40 copay  Physician Information:  Referring Practitioner: Sharon Nagel MD MD Follow-up Visit: ?  Plan of care signed (Y/N): Y  Visit# / total visits:  3/6 (06/15/2020 - 2020)  Pain level: 1/10 R hip/thigh    Progress Note Due (10 visits or 30 days, whichever is less):    Recertification Note Due (End of POC or 90 days, whichever is less):      Subjective:  06/15/2020: Pt reports history of R sciatica >30+ years ago which resolved. About 6 months ago had issues with R hip and went to chiropractor a couple of times and got better. Pt notes after a long day of yard work in 2020 she felt pain R leg (sciatica) and had a hard time standing up straight. Pt reports pain is to R buttock, down posterior thigh and sometimes in her calf. Pt reports pain ranges from 2-5/10. Feels tingling R foot at times. Pt's Dr started her on prednisone 2020 and pt reports this is starting to decrease her pain and tingling although is still present. Pain is worse with standing, walking, kneeling, sitting for a length of time and doing too much and has limited her doing yard work and gardening. Pt is retired and PLOF no pain or limitations. 2020: Pt notes good tolerance to IE. Does note was feeling pretty good until increased standing in kitchen and up/down stairs a lot yesterday so can tell is more sore today. Tingling R side of R foot and ball of foot that comes with increased activity.   Finished prednisone this past Friday and has noticed a slight increase in pain.     2020:  Tingling doesn't seem to be as much. Pain increased with increased kneeling (when at [employer]) or with prolonged walking/standing. Doesn't feel has had as much pain even without being on Prednisone this week. Is more aware of body mechanics when picking up items from lower surface and now has to work on implementing techniques discussed last visit. Objective:06/15/2020   Observation:   Palpation: mild tenderness R buttock  Observation: Pt ambulates with mild antalgia with short distances today      Test measurements:     PROM RLE (degrees)  RLE General PROM: hip flexion WFLs, ER 40 deg, IR 30 deg  PROM LLE (degrees)  LLE General PROM: hip flexion WFLs, ER 45 deg, IR 40 deg  Spine  Lumbar: flexion WFLs with pain upon return, ext 25% with pain, L SB 25 deg, R SB 20 deg     Strength Other  Other: B hip flexion 5/5, B knees 5/5, B DF 5/5, L hip abd 5/5, R hip abd 4/5, able to heel and toe walk  Additional Measures  Flexibility: L 90-90 HS (-) 15 deg, R (-) 30   Special Tests: (+) SLR R, (-) L  Other: decreased R ankle reflex 1+ vs L 2+, LEFS = 35% impaired  Sensation  Overall Sensation Status: (intact to light touch)    Exercises, Neuro Facilitation & Gait Training (70000, [id number], [id number]): Activity Resistance/Repetitions Other comments   Supine HS stretch with strap 10-15\" x 3 R    Sitting piriformis fig 4 stretch, push knee down, lean fwd 10-15\" x 3 each    TrA isometric 5\" x 10    Sciatic n glides R  X 10 LAQ with ankle DF   SKT opp shoulder stretch 15\" x 3 each    TrA with march 3\" x 10 each    Clamshell, red 3\" x 10 each    TrA with bridge 3\" x 10 Half way to avoid increased pain   TrA with wall push-ups X 10                               Therapeutic Activities (79598):    Home Exercise Program:   Pt. demonstrated good understanding and knowledge of HEP. Written instructions provided. 06/15/2020: HS stretch with strap supine, sitting piriformis stretch, TrA isometric  06/22/2020:  TrA with march, sciatic n glides, SKT opp shoulder  06/26/2020: clamshell with band, TrA with bridges, wall push-ups with TrA    Manual Treatments (41850): caution due to osteopenia     Modalities:  Add prn    Timed Code Treatment Minutes:  TE: 45    Total Treatment Minutes:  45     Treatment/Activity Tolerance:  [x] Patient tolerated treatment well [] Patient limited by fatigue  [] Patient limited by pain  [] Patient limited by other medical complications  [] Other:     Assessment: good tolerance to treatment today    Prognosis: [x] Good [] Fair  [] Poor    Patient Requires Follow-up: [x] Yes  [] No    Goals:  Short term goals  Time Frame for Short term goals: 3 weeks  Short term goal 1: Pt will demo good understanding and knowledge of initial HEP  Short term goal 2: Decreased pain 3/10 at worst to allow for improved tolerance to prolonged sitting  Long term goals  Time Frame for Long term goals : 5 weeks  Long term goal 1: Pt will demo good understanding and knowledge of HEP progressions  Long term goal 2: Decreased pain 1/10 at worst with rare paresthesias to allow for sitting tolerance as previous  Long term goal 3: Trunk AROM WFLs, R hip PROM = L and R HS 90-90 = L without pain to allow for N gait pattern with tolerance as previous without increased pain  Long term goal 4: Strength R hip abd 5/5 to allow for prolonged standing, yard work/gardening tasks as previous without increased pain  Long term goal 5: Decreased impairment per LEFS <10% impaired    Plan:   [x] Continue per plan of care [] Alter current plan (see comments)  [] Plan of care initiated [] Hold pending MD visit [] Discharge    Plan for Next Session:  Review HEP, add exercises as tolerated    Electronically signed by:   Derick Cary DPT [id number]

## 2020-06-29 ENCOUNTER — HOSPITAL ENCOUNTER (OUTPATIENT)
Dept: PHYSICAL THERAPY | Age: 75
Setting detail: THERAPIES SERIES
Discharge: HOME | End: 2020-06-29
Payer: MEDICARE

## 2020-06-29 PROCEDURE — 97110 THERAPEUTIC EXERCISES: CPT

## 2020-06-29 NOTE — FLOWSHEET NOTE
be as much. Pain increased with increased kneeling (when at [employer]) or with prolonged walking/standing. Doesn't feel has had as much pain even without being on Prednisone this week. Is more aware of body mechanics when picking up items from lower surface and now has to work on implementing techniques discussed last visit.     06/29/2020: Pt reports that she feels she is 70% better overall. Pt reports that the tingling is subsiding as is not as intense. She did more weeding Saturday which she hadn't done in awhile and wore back brace which she felt helped. Did feel some soreness afterwards so maybe was a little too much. Objective:06/15/2020   Observation:   Palpation: mild tenderness R buttock  Observation: Pt ambulates with mild antalgia with short distances today      Test measurements:     PROM RLE (degrees)  RLE General PROM: hip flexion WFLs, ER 40 deg, IR 30 deg  PROM LLE (degrees)  LLE General PROM: hip flexion WFLs, ER 45 deg, IR 40 deg  Spine  Lumbar: flexion WFLs with pain upon return, ext 25% with pain, L SB 25 deg, R SB 20 deg     Strength Other  Other: B hip flexion 5/5, B knees 5/5, B DF 5/5, L hip abd 5/5, R hip abd 4/5, able to heel and toe walk  Additional Measures  Flexibility: L 90-90 HS (-) 15 deg, R (-) 30   Special Tests: (+) SLR R, (-) L  Other: decreased R ankle reflex 1+ vs L 2+, LEFS = 35% impaired  Sensation  Overall Sensation Status: (intact to light touch)    Exercises, Neuro Facilitation & Gait Training (18061, [id number], [id number]):   Activity Resistance/Repetitions Other comments   Supine HS stretch with strap 10-15\" x 3 B    Sitting piriformis fig 4 stretch, push knee down, lean fwd 10-15\" x 3 each    TrA isometric 5\" x 10    Sciatic n glides R  3\" X 10 Supine in HS stretch position with DF/PF   SKT opp shoulder stretch 15\" x 3 each    TrA with march 3\" x 10 each    Clamshell, red 3\" x 10 each    TrA with bridge 3\" x 10 Able to go full   TrA with push-ups on knees X 15 <10% impaired    Plan:   [x] Continue per plan of care [] Alter current plan (see comments)  [] Plan of care initiated [] Hold pending MD visit [] Discharge    Plan for Next Session:  Review HEP, add exercises as tolerated    Electronically signed by:   Charles Paul DPT [id number]

## 2020-07-01 ENCOUNTER — HOSPITAL ENCOUNTER (OUTPATIENT)
Dept: PHYSICAL THERAPY | Age: 75
Setting detail: THERAPIES SERIES
Discharge: HOME | End: 2020-07-01
Payer: MEDICARE

## 2020-07-01 PROCEDURE — 97530 THERAPEUTIC ACTIVITIES: CPT

## 2020-07-01 PROCEDURE — 97110 THERAPEUTIC EXERCISES: CPT

## 2020-07-01 NOTE — FLOWSHEET NOTE
Physical Therapy Daily Treatment Note  Date:  2020    Patient Name:  Tom Rodriguez    :  1945  MRN: [id number]  Restrictions/Precautions:  osteopenia   Medical/Treatment Diagnosis Information:  · Diagnosis: Right sided sciatica (M54.31 ICD-10-CM)  · Treatment Diagnosis: R LE pain  Insurance/Certification information:  PT Insurance Information: BCBS [place], [place] required, $40 copay  Physician Information:  Referring Practitioner: Farhat De Jesus MD MD Follow-up Visit: ?  Plan of care signed (Y/N): Y  Visit# / total visits:  5/6 (06/15/2020 - 2020)  Pain level: 2.5-3/10 R buttock    Progress Note Due (10 visits or 30 days, whichever is less):    Recertification Note Due (End of POC or 90 days, whichever is less):      Subjective:  06/15/2020: Pt reports history of R sciatica >30+ years ago which resolved. About 6 months ago had issues with R hip and went to chiropractor a couple of times and got better. Pt notes after a long day of yard work in 2020 she felt pain R leg (sciatica) and had a hard time standing up straight. Pt reports pain is to R buttock, down posterior thigh and sometimes in her calf. Pt reports pain ranges from 2-5/10. Feels tingling R foot at times. Pt's Dr started her on prednisone 2020 and pt reports this is starting to decrease her pain and tingling although is still present. Pain is worse with standing, walking, kneeling, sitting for a length of time and doing too much and has limited her doing yard work and gardening. Pt is retired and PLOF no pain or limitations. 2020: Pt notes good tolerance to IE. Does note was feeling pretty good until increased standing in kitchen and up/down stairs a lot yesterday so can tell is more sore today. Tingling R side of R foot and ball of foot that comes with increased activity.   Finished prednisone this past Friday and has noticed a slight increase in pain.     2020:  Tingling doesn't seem to be as much. Pain increased with increased kneeling (when at [employer]) or with prolonged walking/standing. Doesn't feel has had as much pain even without being on Prednisone this week. Is more aware of body mechanics when picking up items from lower surface and now has to work on implementing techniques discussed last visit.     06/29/2020: Pt reports that she feels she is 70% better overall. Pt reports that the tingling is subsiding as is not as intense. She did more weeding Saturday which she hadn't done in awhile and wore back brace which she felt helped. Did feel some soreness afterwards so maybe was a little too much.      07/01/2020:  Pt reports after left building after last visit was sore. Also feels tingling in foot. Did feel some in calf this AM along with increased pain R hip/buttock. Objective:06/15/2020   Observation:   Palpation: mild tenderness R buttock  Observation: Pt ambulates with mild antalgia with short distances today      Test measurements:     PROM RLE (degrees)  RLE General PROM: hip flexion WFLs, ER 40 deg, IR 30 deg  PROM LLE (degrees)  LLE General PROM: hip flexion WFLs, ER 45 deg, IR 40 deg  Spine  Lumbar: flexion WFLs with pain upon return, ext 25% with pain, L SB 25 deg, R SB 20 deg     Strength Other  Other: B hip flexion 5/5, B knees 5/5, B DF 5/5, L hip abd 5/5, R hip abd 4/5, able to heel and toe walk  Additional Measures  Flexibility: L 90-90 HS (-) 15 deg, R (-) 30   Special Tests: (+) SLR R, (-) L  Other: decreased R ankle reflex 1+ vs L 2+, LEFS = 35% impaired  Sensation  Overall Sensation Status: (intact to light touch)    Exercises, Neuro Facilitation & Gait Training (10778, [id number], [phone]):   Activity Resistance/Repetitions Other comments   Supine HS stretch with strap 10-15\" x 3 B    Sitting piriformis fig 4 stretch, push knee down, lean fwd 10-15\" x 3 each    TrA isometric 5\" x 10    Sciatic n glides R  3\" X 10 Supine in HS stretch position with DF/PF  Cues for technique   SKT opp shoulder stretch 15\" x 3 each    TrA with march 3\" x 10 each    Clamshell, red 3\" x 10 each    TrA with bridge 3\" x 10 Able to go full      Pelvic tilts sitting on large exercise ball X 10 each way  Fwd/bkwd, side to side, circles each way                          Therapeutic Activities (21864):  Encouraged piriformis stretch before getting up from prolonged sitting and activate TrA with sit to stand  Encouraged TrA activation prior to reaching [place] or with prolonged standing/walking  Home Exercise Program:   Pt. demonstrated good understanding and knowledge of HEP. Written instructions provided. 06/15/2020: HS stretch with strap supine, sitting piriformis stretch, TrA isometric  06/22/2020:  TrA with march, sciatic n glides, SKT opp shoulder  06/26/2020: clamshell with band, TrA with bridges, wall push-ups with TrA  06/29/2020: pelvic tilts on exercise ball, sciatic n glides switch to supine and push-ups switch to on knees    Manual Treatments (54026): caution due to osteopenia     Modalities:  Add prn    Timed Code Treatment Minutes:  TE: 31, TA: 8    Total Treatment Minutes:  39     Treatment/Activity Tolerance:  [x] Patient tolerated treatment well [] Patient limited by fatigue  [] Patient limited by pain  [] Patient limited by other medical complications  [] Other:     Assessment: after exercises pain decreased to 1-1.5/10 and felt less tingling than upon arrival today    Prognosis: [x] Good [] Fair  [] Poor    Patient Requires Follow-up: [x] Yes  [] No    Goals:  Short term goals  Time Frame for Short term goals: 3 weeks  Short term goal 1: Pt will demo good understanding and knowledge of initial HEP  Short term goal 2: Decreased pain 3/10 at worst to allow for improved tolerance to prolonged sitting  Long term goals  Time Frame for Long term goals : 5 weeks  Long term goal 1: Pt will demo good understanding and knowledge of HEP progressions  Long term goal 2: Decreased pain 1/10 at worst with rare paresthesias to allow for sitting tolerance as previous  Long term goal 3: Trunk AROM WFLs, R hip PROM = L and R HS 90-90 = L without pain to allow for N gait pattern with tolerance as previous without increased pain  Long term goal 4: Strength R hip abd 5/5 to allow for prolonged standing, yard work/gardening tasks as previous without increased pain  Long term goal 5: Decreased impairment per LEFS <10% impaired    Plan:   [x] Continue per plan of care [] Alter current plan (see comments)  [] Plan of care initiated [] Hold pending MD visit [] Discharge    Plan for Next Session:  Review HEP, add exercises as tolerated    Electronically signed by:   Lucretia Dunaway DPT [id number]

## 2020-07-17 ENCOUNTER — HOSPITAL ENCOUNTER (OUTPATIENT)
Dept: PHYSICAL THERAPY | Age: 75
Setting detail: THERAPIES SERIES
Discharge: HOME | End: 2020-07-17
Payer: MEDICARE

## 2020-07-17 PROCEDURE — 97110 THERAPEUTIC EXERCISES: CPT

## 2020-07-17 NOTE — PROGRESS NOTES
Outpatient Physical Therapy  Phone: [phone] Fax: [phone]    To: Nupur Jimenez MD   From: Nicole Wade PT, DPT [id number]   Date: 2020  Patient: Joaquin Pineda     : 1945 MRN: [id number]  Medical Diagnosis:  Right sided sciatica (M54.31 ICD-10-CM)  Treatment Diagnosis:  R LE pain      Physical Therapy Progress Note    Time Period for Report:  06/15/2020 - 2020    Total Visits to date:   6 Cancels/No-shows to date:  0/0    Plan of Care/Treatment to date:  [x] Therapeutic Exercise (Review/Progress HEP and provide verbal/tactile cueing for activities related to strengthening, flexibility,  endurance, ROM.)       [x] Therapeutic Activity (Provide verbal/tactile cueing for dynamic activities to promote functional tasks.)          [] Gait Training (Provide verbal/tactile/visual cueing for facilitation of normalized gait pattern without or with the least restrictive AD to decrease pain and/or risk for falling.)          [] Neuromuscular Re-education (Review/Progress HEP and provide verbal/tactile cueing for activities related to improving balance, coordination, kinesthetic sense, posture, motor skill, proprioception.)         [] Manual Therapy (Provide manual therapy to mobilize soft tissue/joints for the purpose of modulating pain, promoting relaxation, increasing ROM, reducing/eliminating soft tissue swelling/inflammation/tightness, improving soft tissue extensibility)                [] Modalities (For modulating pain/tenderness/paresthesias, reducing swelling/inflammation/tightness, improving soft tissue extensibility, and/or to increase muscle tone/strength):     [] Ultrasound  [] Electrical Stimulation        [] Cervical Traction [] Lumbar Traction    ? [] Cold/hotpack [] Iontophoresis   Other:      []          []     Significant Findings At Last Visit/Comments:    · No current pain. Pt reports has been able to walk longer than previously without increased pain.   However, after standing 10-15 mins can feel increased pain. Pain at worst 4/10 with standing then sits down to relieve pain. Pain now no longer into R calf. Pt reports is 80% improved with the exception of standing. Tingling R foot is not as strong and is not as often. Hasn't had to use ice/heat recently. Trunk AROM WFLs flexion without pain upon return, ext 50% without pain, B SB 25 deg with mild pain with return from R SB. PROM R hip ER 45 deg, IR 45 deg. R HS 90-90 (-) 20 deg. Strength R hip abd 4+/5. LEFS = 44% impaired. Progress towards goals:    Short term goals  Time Frame for Short term goals: 3 weeks  Short term goal 1: Pt will demo good understanding and knowledge of initial HEP MET  Short term goal 2: Decreased pain 3/10 at worst to allow for improved tolerance to prolonged sitting nearly met  Long term goals  Time Frame for Long term goals : 5 weeks  Long term goal 1: Pt will demo good understanding and knowledge of HEP progressions ongoing  Long term goal 2: Decreased pain 1/10 at worst with rare paresthesias to allow for sitting tolerance as previous partially met  Long term goal 3: Trunk AROM WFLs, R hip PROM = L and R HS 90-90 = L without pain to allow for N gait pattern with tolerance as previous without increased pain partially met  Long term goal 4: Strength R hip abd 5/5 to allow for prolonged standing, yard work/gardening tasks as previous without increased pain partially met  Long term goal 5: Decreased impairment per LEFS <10% impaired not met    Frequency/Duration:  # Days per week: [x] 1 day # Weeks: [] 1 week [] 4 weeks      [x] 2 days?    [] 2 weeks [x] 5 weeks      [] 3 days   [] 3 weeks [] 6 weeks     Rehab Potential: [] Excellent [x] Good [] Fair  [] Poor     Goal Status:  [] Achieved [x] Partially Achieved  [] Not Achieved     Patient Status: [] Continue per initial plan of Care     [] Patient now discharged     [x] Additional visits requested, Please re-certify for additional visits: Pt has made good progress towards goals but still presents with pain, mild strength and HS flexibility deficits limiting prolonged standing. Further improvement expected with additional therapy. Requested frequency/duration:  1-2 X/week for 3 weeks    Electronically signed by: Kristian Estrada PT, DPT [id number]    If you have any questions or concerns, please don't hesitate to call.   Thank you for your referral.    Physician Signature:________________________________Date:__________________  By signing above, therapists plan is approved by physician

## 2020-07-17 NOTE — PROGRESS NOTES
The Lower Extremity Functional Scale    Patient: Jessica Navarro  : 1945  MRN: [id number]  Date: 2020  Electronically Signed by: Thomos Lundborg , PT, DPT [id number]     We are interested in knowing whether you are having any difficulty at all with the activities listed below because of your lower limb problem for which you are currently seeking attention. Please provide an answer for each activity.          Today would you have difficulty at all with:    Activities Extreme Difficulty or Unable to Perform Activity Quite a Bit of Difficulty Moderate Difficulty A Little Bit of Difficulty No Difficulty   1 Any of your usual work, housework, or school activities []0 []1 []2  [x]3 []4   2 Your usual hobbies, recreational, or sporting activities []0 []1 [x]2 []3 []4   3 Getting into or out of the bath []0 []1 []2 []3 [x]4   4 Walking between rooms []0 []1 []2 []3 [x]4   5 Putting on your shoes or socks []0 []1 []2 []3 [x]4   6 Squatting []0 []1 []2 []3 [x]4   7 Lifting an object, like a bag of groceries from the floor []0 []1 [x]2 []3 []4   8 Performing light activities around your home []0 []1 []2 []3 [x]4   9 Performing heavy activities around your home []0 []1 [x]2 []3 []4   10 Getting into or out of a car []0 []1 []2 [x]3 []4   11 Walking 2 blocks []0 [x]1 []2 []3 []4   12 Walking a mile [x]0 []1 []2 []3 []4   13 Going up or down 10 stairs (about 1 flight of stairs) []0 []1 []2 []3 [x]4   14 Standing for 1 hour []0 [x]1 []2 []3 []4   15 Sitting for 1 hour []0 []1 [x]2 []3 []4   16 Running on even ground  [x]0 []1 []2 []3 []4   17 Running on uneven ground  [x]0 []1 []2 []3 []4   18 Making sharp turns while running fast  [x]0 []1 []2 []3 []4   19 Hopping []0 []1 [x]2 []3 []4   20 Rolling over in bed []0 []1 []2 [x]3 []4    Column Totals:          Patient Score from Above: 45/80    (Patient Score / 80) X 100:  56%                          Scoring Method for Lower Extremity Functional Scale    The Lower Extremity Functional Scale (LEFS) is an easily administered and scored functional outcome tool. It can be utilized for lower extremity conditions and is sensitive enough for a wide range of functional disability levels. It can and should be used on the initial visit and subsequently on a 2- 3 week basis to measure patient's progress. The tool has a sufficient measure of reliability, variability, and sensitivity to change for determining minimally clinically important score differences, on a test to re-test basis. Scoring:   LEFS Score = (Sum of Responses / 80) X 100    Error + / - 5 points, Minimum Level of Detectable Change (90% Confidence): 9 Points     MIKEY Gomez, MCKAYLA Mcclure, VADIM Barton, [employer] The [address] [place] [address], The Lower Extremity Functional Scale: Scale development, measurement properties, and clinical application, Physical Therapy, 1999, 79, [id number], with permission of the [employer] Association.       G-Code [place]:  LEFS Total Score Disability Index CMS Modifier   80 0% []CH   79-64 1-19% []CI   63-48 20-39% []CJ   47-32 40-59% []CK   31-16 60-79% []CL   15-1 80-99% []CM   0 100% []CN

## 2020-07-17 NOTE — FLOWSHEET NOTE
Physical Therapy Daily Treatment Note  Date:  2020    Patient Name:  Sreekanth Gary    :  1945  MRN: [id number]  Restrictions/Precautions:  osteopenia   Medical/Treatment Diagnosis Information:  · Diagnosis: Right sided sciatica (M54.31 ICD-10-CM)  · Treatment Diagnosis: R LE pain  Insurance/Certification information:  PT Insurance Information: BCBS [place], [place] required, $40 copay  Physician Information:  Referring Practitioner: Anna Burnette MD MD Follow-up Visit: ?  Plan of care signed (Y/N): Y  Visit# / total visits:  6/ (06/15/2020 - 2020)  Pain level: 0/10 R buttock    Progress Note Due (10 visits or 30 days, whichever is less): 9902   Recertification Note Due (End of POC or 90 days, whichever is less):      Subjective:  06/15/2020: Pt reports history of R sciatica >30+ years ago which resolved. About 6 months ago had issues with R hip and went to chiropractor a couple of times and got better. Pt notes after a long day of yard work in 2020 she felt pain R leg (sciatica) and had a hard time standing up straight. Pt reports pain is to R buttock, down posterior thigh and sometimes in her calf. Pt reports pain ranges from 2-5/10. Feels tingling R foot at times. Pt's Dr started her on prednisone 2020 and pt reports this is starting to decrease her pain and tingling although is still present. Pain is worse with standing, walking, kneeling, sitting for a length of time and doing too much and has limited her doing yard work and gardening. Pt is retired and PLOF no pain or limitations. 2020: Pt notes good tolerance to IE. Does note was feeling pretty good until increased standing in kitchen and up/down stairs a lot yesterday so can tell is more sore today. Tingling R side of R foot and ball of foot that comes with increased activity.   Finished prednisone this past Friday and has noticed a slight increase in pain.     2020:  Tingling doesn't seem to be as much. Pain increased with increased kneeling (when at [employer]) or with prolonged walking/standing. Doesn't feel has had as much pain even without being on Prednisone this week. Is more aware of body mechanics when picking up items from lower surface and now has to work on implementing techniques discussed last visit.     06/29/2020: Pt reports that she feels she is 70% better overall. Pt reports that the tingling is subsiding as is not as intense. She did more weeding Saturday which she hadn't done in awhile and wore back brace which she felt helped. Did feel some soreness afterwards so maybe was a little too much.      07/01/2020:  Pt reports after left building after last visit was sore. Also feels tingling in foot. Did feel some in calf this AM along with increased pain R hip/buttock. 07/17/2020: Pt reports has been able to walk longer than previously without increased pain. However, after standing 10-15 mins can feel increased pain. Pain at worst 4/10 with standing then sits down to relieve pain. Pain now no longer into R calf. Pt reports is 80% improved with the exception of standing. Tingling R foot is not as strong and is not as often. Hasn't had to use ice/heat recently.        Objective:06/15/2020   Observation:   Palpation: mild tenderness R buttock  Observation: Pt ambulates with mild antalgia with short distances today      Test measurements:     PROM RLE (degrees)  RLE General PROM: hip flexion WFLs, ER 40 deg, IR 30 deg  PROM LLE (degrees)  LLE General PROM: hip flexion WFLs, ER 45 deg, IR 40 deg  Spine  Lumbar: flexion WFLs with pain upon return, ext 25% with pain, L SB 25 deg, R SB 20 deg     Strength Other  Other: B hip flexion 5/5, B knees 5/5, B DF 5/5, L hip abd 5/5, R hip abd 4/5, able to heel and toe walk  Additional Measures  Flexibility: L 90-90 HS (-) 15 deg, R (-) 30   Special Tests: (+) SLR R, (-) L  Other: decreased R ankle reflex 1+ vs L 2+, LEFS = 35% impaired  Sensation  Overall Sensation Status: (intact to light touch)    07/17/2020: Trunk AROM WFLs flexion without pain upon return, ext 50% without pain, B SB 25 deg with mild pain with return from R SB. PROM R hip ER 45 deg, IR 45 deg. R HS 90-90 (-) 20 deg. Strength R hip abd 4+/5. LEFS = 44% impaired. Exercises, Neuro Facilitation & Gait Training (55266, [id number]): Activity Resistance/Repetitions Other comments   Supine HS stretch with strap 10-15\" x 3 B    Sitting piriformis fig 4 stretch, push knee down, lean fwd 10-15\" x 3 each    TrA isometric 5\" x 10    Sciatic n glides R  3\" X 10 Supine in HS stretch position with DF/PF  Cues for technique   SKT opp shoulder stretch 15\" x 3 each    TrA with march 3\" x 10 each    Clamshell, red 3\" x 10 each    TrA with bridge 3\" x 10 Able to go full      Pelvic tilts sitting on large exercise ball X 10 each way  Fwd/bkwd, side to side, circles each way                          Therapeutic Activities (75064):    Home Exercise Program:   Pt. demonstrated good understanding and knowledge of HEP. Written instructions provided. 06/15/2020: HS stretch with strap supine, sitting piriformis stretch, TrA isometric  06/22/2020:  TrA with march, sciatic n glides, SKT opp shoulder  06/26/2020: clamshell with band, TrA with bridges, wall push-ups with TrA  06/29/2020: pelvic tilts on exercise ball, sciatic n glides switch to supine and push-ups switch to on knees    Manual Treatments (32170): caution due to osteopenia     Modalities:  Add prn    Timed Code Treatment Minutes:  TE: 40    Total Treatment Minutes:  40     Treatment/Activity Tolerance:  [x] Patient tolerated treatment well [] Patient limited by fatigue  [] Patient limited by pain  [] Patient limited by other medical complications  [] Other:     Assessment: see goal assessment below    Prognosis: [x] Good [] Fair  [] Poor    Patient Requires Follow-up: [x] Yes  [] No    Goals: assessed 07/17/2020  Short term goals  Time Frame for Short term goals: 3 weeks  Short term goal 1: Pt will demo good understanding and knowledge of initial HEP MET  Short term goal 2: Decreased pain 3/10 at worst to allow for improved tolerance to prolonged sitting nearly met  Long term goals  Time Frame for Long term goals : 5 weeks  Long term goal 1: Pt will demo good understanding and knowledge of HEP progressions ongoing  Long term goal 2: Decreased pain 1/10 at worst with rare paresthesias to allow for sitting tolerance as previous partially met  Long term goal 3: Trunk AROM WFLs, R hip PROM = L and R HS 90-90 = L without pain to allow for N gait pattern with tolerance as previous without increased pain partially met  Long term goal 4: Strength R hip abd 5/5 to allow for prolonged standing, yard work/gardening tasks as previous without increased pain partially met  Long term goal 5: Decreased impairment per LEFS <10% impaired not met    Plan:   [x] Continue per plan of care, requested additional visits thru [facility] [] Alter current plan (see comments)  [] Plan of care initiated [] Hold pending MD visit [] Discharge    Plan for Next Session:  Review HEP, add exercises as tolerated    Electronically signed by:   Celeste Herrmann, DPT [id number]

## 2020-07-23 ENCOUNTER — HOSPITAL ENCOUNTER (OUTPATIENT)
Dept: PHYSICAL THERAPY | Age: 75
Setting detail: THERAPIES SERIES
Discharge: HOME | End: 2020-07-23
Payer: MEDICARE

## 2020-07-23 PROCEDURE — 97110 THERAPEUTIC EXERCISES: CPT

## 2020-07-23 NOTE — FLOWSHEET NOTE
Physical Therapy Daily Treatment Note  Date:  2020    Patient Name:  Jaclyn Carroll    :  1945  MRN: [id number]  Restrictions/Precautions:  osteopenia   Medical/Treatment Diagnosis Information:  · Diagnosis: Right sided sciatica (M54.31 ICD-10-CM)  · Treatment Diagnosis: R LE pain  Insurance/Certification information:  PT Insurance Information: BCBS [place], [place] required, $40 copay  Physician Information:  Referring Practitioner: Rabia Arrington MD MD Follow-up Visit: ?  Plan of care signed (Y/N): Y  Visit# / total visits:  / (06/15/2020 - 2020) +  (2020 - 2020)   Pain level: 0/10 R buttock    Progress Note Due (10 visits or 30 days, whichever is less):    Recertification Note Due (End of POC or 90 days, whichever is less):      Subjective:  06/15/2020: Pt reports history of R sciatica >30+ years ago which resolved. About 6 months ago had issues with R hip and went to chiropractor a couple of times and got better. Pt notes after a long day of yard work in 2020 she felt pain R leg (sciatica) and had a hard time standing up straight. Pt reports pain is to R buttock, down posterior thigh and sometimes in her calf. Pt reports pain ranges from 2-5/10. Feels tingling R foot at times. Pt's Dr started her on prednisone 2020 and pt reports this is starting to decrease her pain and tingling although is still present. Pain is worse with standing, walking, kneeling, sitting for a length of time and doing too much and has limited her doing yard work and gardening. Pt is retired and PLOF no pain or limitations. 2020: Pt notes good tolerance to IE. Does note was feeling pretty good until increased standing in kitchen and up/down stairs a lot yesterday so can tell is more sore today. Tingling R side of R foot and ball of foot that comes with increased activity. Finished prednisone this past Friday and has noticed a slight increase in pain. Treatment/Activity Tolerance:  [x] Patient tolerated treatment well [] Patient limited by fatigue  [] Patient limited by pain  [] Patient limited by other medical complications  [] Other:     Assessment: good tolerance to exercises toady    Prognosis: [x] Good [] Fair  [] Poor    Patient Requires Follow-up: [x] Yes  [] No    Goals: assessed 07/17/2020  Short term goals  Time Frame for Short term goals: 3 weeks  Short term goal 1: Pt will demo good understanding and knowledge of initial HEP MET  Short term goal 2: Decreased pain 3/10 at worst to allow for improved tolerance to prolonged sitting nearly met  Long term goals  Time Frame for Long term goals : 5 weeks  Long term goal 1: Pt will demo good understanding and knowledge of HEP progressions ongoing  Long term goal 2: Decreased pain 1/10 at worst with rare paresthesias to allow for sitting tolerance as previous partially met  Long term goal 3: Trunk AROM WFLs, R hip PROM = L and R HS 90-90 = L without pain to allow for N gait pattern with tolerance as previous without increased pain partially met  Long term goal 4: Strength R hip abd 5/5 to allow for prolonged standing, yard work/gardening tasks as previous without increased pain partially met  Long term goal 5: Decreased impairment per LEFS <10% impaired not met    Plan:   [x] Continue per plan of care [] Alter current plan (see comments)  [] Plan of care initiated [] Hold pending MD visit [] Discharge    Plan for Next Session:  Review HEP, add exercises as tolerated    Electronically signed by:   LAUREN FairchildT [id number]

## 2020-07-27 ENCOUNTER — HOSPITAL ENCOUNTER (OUTPATIENT)
Dept: PHYSICAL THERAPY | Age: 75
Setting detail: THERAPIES SERIES
Discharge: HOME | End: 2020-07-27
Payer: MEDICARE

## 2020-07-27 PROCEDURE — 97110 THERAPEUTIC EXERCISES: CPT

## 2020-07-27 NOTE — FLOWSHEET NOTE
06/26/2020:  Tingling doesn't seem to be as much. Pain increased with increased kneeling (when at [employer]) or with prolonged walking/standing. Doesn't feel has had as much pain even without being on Prednisone this week. Is more aware of body mechanics when picking up items from lower surface and now has to work on implementing techniques discussed last visit.     06/29/2020: Pt reports that she feels she is 70% better overall. Pt reports that the tingling is subsiding as is not as intense. She did more weeding Saturday which she hadn't done in awhile and wore back brace which she felt helped. Did feel some soreness afterwards so maybe was a little too much.      07/01/2020:  Pt reports after left building after last visit was sore. Also feels tingling in foot. Did feel some in calf this AM along with increased pain R hip/buttock. 07/17/2020: Pt reports has been able to walk longer than previously without increased pain. However, after standing 10-15 mins can feel increased pain. Pain at worst 4/10 with standing then sits down to relieve pain. Pain now no longer into R calf. Pt reports is 80% improved with the exception of standing. Tingling R foot is not as strong and is not as often. Hasn't had to use ice/heat recently. 07/23/2020: \"I am doing great today\" but yesterday was standing a lot in kitchen and could feel more pain with that. Pain yesterday up to 5/10 with tingling in R foot. 07/27/2020:  Some days are better than others. Pt feels is 80% improved overall. Static standing (such as when talking or in line) is still where she gets complaints - about 10 mins. If is standing and moving it is better.            Objective:06/15/2020   Observation:   Palpation: mild tenderness R buttock  Observation: Pt ambulates with mild antalgia with short distances today      Test measurements:     PROM RLE (degrees)  RLE General PROM: hip flexion WFLs, ER 40 deg, IR 30 deg  PROM LLE (degrees)  LLE General PROM: hip flexion WFLs, ER 45 deg, IR 40 deg  Spine  Lumbar: flexion WFLs with pain upon return, ext 25% with pain, L SB 25 deg, R SB 20 deg     Strength Other  Other: B hip flexion 5/5, B knees 5/5, B DF 5/5, L hip abd 5/5, R hip abd 4/5, able to heel and toe walk  Additional Measures  Flexibility: L 90-90 HS (-) 15 deg, R (-) 30   Special Tests: (+) SLR R, (-) L  Other: decreased R ankle reflex 1+ vs L 2+, LEFS = 35% impaired  Sensation  Overall Sensation Status: (intact to light touch)    07/17/2020: Trunk AROM WFLs flexion without pain upon return, ext 50% without pain, B SB 25 deg with mild pain with return from R SB. PROM R hip ER 45 deg, IR 45 deg. R HS 90-90 (-) 20 deg. Strength R hip abd 4+/5. LEFS = 44% impaired. Exercises, Neuro Facilitation & Gait Training (29058, [id number]): Activity Resistance/Repetitions Other comments   Supine HS stretch with strap 10-15\" x 3 B    Sitting piriformis fig 4 stretch, push knee down, lean fwd 10-15\" x 3 each    TrA isometric 5\" x 10    Sciatic n glides R  3\" X 10 Supine in HS stretch position with DF/PF     SKT opp shoulder stretch 15\" x 3 each    TrA with march 3\" x 10 each    Clamshell, red 3\" x 10 each    TrA with bridge 3\" x 10 Able to go full      Pelvic tilts sitting on large exercise ball X 10 each way  Fwd/bkwd, side to side, circles each way    Marches on exercise ball 2\" x 10 each alternating    LAQ on exercise ball 2\" x 10 each alternating    Squat on ball on wall with TrA 3-5\" x 10           Therapeutic Activities (41927):    Home Exercise Program:   Pt. demonstrated good understanding and knowledge of HEP. Written instructions provided. 06/15/2020: HS stretch with strap supine, sitting piriformis stretch, TrA isometric  06/22/2020:  TrA with march, sciatic n glides, SKT opp shoulder  06/26/2020: clamshell with band, TrA with bridges, wall push-ups with TrA  06/29/2020: pelvic tilts on exercise ball, sciatic n glides switch to supine and push-ups switch to on knees  07/23/2020: marches and LAQ on exercise ball  07/27/2020: ball squats on wall    Manual Treatments (60351): caution due to osteopenia     Modalities:  Add prn    Timed Code Treatment Minutes:  TE: 33    Total Treatment Minutes:  33     Treatment/Activity Tolerance:  [x] Patient tolerated treatment well [] Patient limited by fatigue  [] Patient limited by pain  [] Patient limited by other medical complications  [] Other:     Assessment: no pain or tingling reported after treatment    Prognosis: [x] Good [] Fair  [] Poor    Patient Requires Follow-up: [x] Yes  [] No    Goals: assessed 07/17/2020  Short term goals  Time Frame for Short term goals: 3 weeks  Short term goal 1: Pt will demo good understanding and knowledge of initial HEP MET  Short term goal 2: Decreased pain 3/10 at worst to allow for improved tolerance to prolonged sitting nearly met  Long term goals  Time Frame for Long term goals : 5 weeks  Long term goal 1: Pt will demo good understanding and knowledge of HEP progressions ongoing  Long term goal 2: Decreased pain 1/10 at worst with rare paresthesias to allow for sitting tolerance as previous partially met  Long term goal 3: Trunk AROM WFLs, R hip PROM = L and R HS 90-90 = L without pain to allow for N gait pattern with tolerance as previous without increased pain partially met  Long term goal 4: Strength R hip abd 5/5 to allow for prolonged standing, yard work/gardening tasks as previous without increased pain partially met  Long term goal 5: Decreased impairment per LEFS <10% impaired not met    Plan:   [x] Continue per plan of care [] Alter current plan (see comments)  [] Plan of care initiated [] Hold pending MD visit [] Discharge    Plan for Next Session:  Review HEP, add exercises as tolerated    Electronically signed by:   Celeste Herrmann, DPT [id number]

## 2020-07-29 ENCOUNTER — APPOINTMENT (OUTPATIENT)
Dept: PHYSICAL THERAPY | Age: 75
End: 2020-07-29
Payer: MEDICARE

## 2020-07-31 ENCOUNTER — HOSPITAL ENCOUNTER (OUTPATIENT)
Dept: PHYSICAL THERAPY | Age: 75
Setting detail: THERAPIES SERIES
Discharge: HOME | End: 2020-07-31
Payer: MEDICARE

## 2020-07-31 PROCEDURE — 97110 THERAPEUTIC EXERCISES: CPT

## 2020-07-31 NOTE — FLOWSHEET NOTE
Physical Therapy Daily Treatment Note  Date:  2020    Patient Name:  Rachell Yarbrough    :  1945  MRN: [id number]  Restrictions/Precautions:  osteopenia   Medical/Treatment Diagnosis Information:  · Diagnosis: Right sided sciatica (M54.31 ICD-10-CM)  · Treatment Diagnosis: R LE pain  Insurance/Certification information:  PT Insurance Information: [employer] [place] required, $40 copay  Physician Information:  Referring Practitioner: Lorenzo Herrmann MD MD Follow-up Visit: ?  Plan of care signed (Y/N): Y  Visit# / total visits:  / (06/15/2020 - 2020) + 3/7 (2020 - 2020)   Pain level: 2-3/10 R buttock    Progress Note Due (10 visits or 30 days, whichever is less):    Recertification Note Due (End of POC or 90 days, whichever is less):      Subjective:  06/15/2020: Pt reports history of R sciatica >30+ years ago which resolved. About 6 months ago had issues with R hip and went to chiropractor a couple of times and got better. Pt notes after a long day of yard work in 2020 she felt pain R leg (sciatica) and had a hard time standing up straight. Pt reports pain is to R buttock, down posterior thigh and sometimes in her calf. Pt reports pain ranges from 2-5/10. Feels tingling R foot at times. Pt's Dr started her on prednisone 2020 and pt reports this is starting to decrease her pain and tingling although is still present. Pain is worse with standing, walking, kneeling, sitting for a length of time and doing too much and has limited her doing yard work and gardening. Pt is retired and PLOF no pain or limitations. 2020: Pt notes good tolerance to IE. Does note was feeling pretty good until increased standing in kitchen and up/down stairs a lot yesterday so can tell is more sore today. Tingling R side of R foot and ball of foot that comes with increased activity. Finished prednisone this past Friday and has noticed a slight increase in pain. 06/26/2020:  Tingling doesn't seem to be as much. Pain increased with increased kneeling (when at [employer]) or with prolonged walking/standing. Doesn't feel has had as much pain even without being on Prednisone this week. Is more aware of body mechanics when picking up items from lower surface and now has to work on implementing techniques discussed last visit.     06/29/2020: Pt reports that she feels she is 70% better overall. Pt reports that the tingling is subsiding as is not as intense. She did more weeding Saturday which she hadn't done in awhile and wore back brace which she felt helped. Did feel some soreness afterwards so maybe was a little too much.      07/01/2020:  Pt reports after left building after last visit was sore. Also feels tingling in foot. Did feel some in calf this AM along with increased pain R hip/buttock. 07/17/2020: Pt reports has been able to walk longer than previously without increased pain. However, after standing 10-15 mins can feel increased pain. Pain at worst 4/10 with standing then sits down to relieve pain. Pain now no longer into R calf. Pt reports is 80% improved with the exception of standing. Tingling R foot is not as strong and is not as often. Hasn't had to use ice/heat recently. 07/23/2020: \"I am doing great today\" but yesterday was standing a lot in kitchen and could feel more pain with that. Pain yesterday up to 5/10 with tingling in R foot. 07/27/2020:  Some days are better than others. Pt feels is 80% improved overall. Static standing (such as when talking or in line) is still where she gets complaints - about 10 mins. If is standing and moving it is better. 07/31/2020:  Pt reports did some lighter lifting since last seen and didn't feel \"too bad\" after. Previously would have had increased pain with this type of activity.         Objective:06/15/2020   Observation:   Palpation: mild tenderness R buttock  Observation: Pt ambulates with mild antalgia with short distances today      Test measurements:     PROM RLE (degrees)  RLE General PROM: hip flexion WFLs, ER 40 deg, IR 30 deg  PROM LLE (degrees)  LLE General PROM: hip flexion WFLs, ER 45 deg, IR 40 deg  Spine  Lumbar: flexion WFLs with pain upon return, ext 25% with pain, L SB 25 deg, R SB 20 deg     Strength Other  Other: B hip flexion 5/5, B knees 5/5, B DF 5/5, L hip abd 5/5, R hip abd 4/5, able to heel and toe walk  Additional Measures  Flexibility: L 90-90 HS (-) 15 deg, R (-) 30   Special Tests: (+) SLR R, (-) L  Other: decreased R ankle reflex 1+ vs L 2+, LEFS = 35% impaired  Sensation  Overall Sensation Status: (intact to light touch)    07/17/2020: Trunk AROM WFLs flexion without pain upon return, ext 50% without pain, B SB 25 deg with mild pain with return from R SB. PROM R hip ER 45 deg, IR 45 deg. R HS 90-90 (-) 20 deg. Strength R hip abd 4+/5. LEFS = 44% impaired. Exercises, Neuro Facilitation & Gait Training (91581, [id number]): Activity Resistance/Repetitions Other comments   Supine HS stretch with strap 10-15\" x 3 B    Sitting piriformis fig 4 stretch, push knee down, lean fwd 10-15\" x 3 each    TrA isometric 5\" x 10    Sciatic n glides R  3\" X 10 Supine in HS stretch position with DF/PF     SKT opp shoulder stretch 15\" x 3 each    TrA with march 3\" x 10 each    Clamshell, red 3\" x 10 each    TrA with bridge 3\" x 10 Able to go full      Pelvic tilts sitting on large exercise ball X 10 each way  Fwd/bkwd, side to side, circles each way    Marches on exercise ball 2\" x 10 each alternating    LAQ on exercise ball 2\" x 10 each alternating    Squat on ball on wall with TrA 3-5\" x 10    Fwd/lat lunges with chest press holding purse X 5 each B alternating                     Therapeutic Activities (60328):    Home Exercise Program:   Pt. demonstrated good understanding and knowledge of HEP. Written instructions provided.     06/15/2020: HS stretch with strap supine, sitting piriformis stretch, TrA isometric  06/22/2020:  TrA with march, sciatic n glides, SKT opp shoulder  06/26/2020: clamshell with band, TrA with bridges, wall push-ups with TrA  06/29/2020: pelvic tilts on exercise ball, sciatic n glides switch to supine and push-ups switch to on knees  07/23/2020: marches and LAQ on exercise ball  07/27/2020: ball squats on wall  07/31/2020: ant/lat lunges with chest press    Manual Treatments (40092): caution due to osteopenia     Modalities:  Add prn    Timed Code Treatment Minutes:  TE: 38    Total Treatment Minutes:  38    Treatment/Activity Tolerance:  [x] Patient tolerated treatment well [] Patient limited by fatigue  [] Patient limited by pain  [] Patient limited by other medical complications  [] Other:     Assessment: good tolerance to new exercises    Prognosis: [x] Good [] Fair  [] Poor    Patient Requires Follow-up: [x] Yes  [] No    Goals: assessed 07/17/2020  Short term goals  Time Frame for Short term goals: 3 weeks  Short term goal 1: Pt will demo good understanding and knowledge of initial HEP MET  Short term goal 2: Decreased pain 3/10 at worst to allow for improved tolerance to prolonged sitting nearly met  Long term goals  Time Frame for Long term goals : 5 weeks  Long term goal 1: Pt will demo good understanding and knowledge of HEP progressions ongoing  Long term goal 2: Decreased pain 1/10 at worst with rare paresthesias to allow for sitting tolerance as previous partially met  Long term goal 3: Trunk AROM WFLs, R hip PROM = L and R HS 90-90 = L without pain to allow for N gait pattern with tolerance as previous without increased pain partially met  Long term goal 4: Strength R hip abd 5/5 to allow for prolonged standing, yard work/gardening tasks as previous without increased pain partially met  Long term goal 5: Decreased impairment per LEFS <10% impaired not met    Plan:   [x] Continue per plan of care [] Alter current plan (see comments)  [] Plan of care initiated [] Hold pending MD visit [] Discharge    Plan for Next Session:  Review HEP, add exercises as tolerated    Electronically signed by:   Lucretia Dunaway, GERALD [id number]

## 2020-08-03 ENCOUNTER — HOSPITAL ENCOUNTER (OUTPATIENT)
Dept: PHYSICAL THERAPY | Age: 75
Setting detail: THERAPIES SERIES
Discharge: HOME | End: 2020-08-03
Payer: MEDICARE

## 2020-08-03 ENCOUNTER — APPOINTMENT (OUTPATIENT)
Dept: PHYSICAL THERAPY | Age: 75
End: 2020-08-03
Payer: MEDICARE

## 2020-08-03 PROCEDURE — 97110 THERAPEUTIC EXERCISES: CPT

## 2020-08-03 NOTE — FLOWSHEET NOTE
Physical Therapy Daily Treatment Note  Date:  8/3/2020    Patient Name:  Simon Montanez    :  1945  MRN: [id number]  Restrictions/Precautions:  osteopenia   Medical/Treatment Diagnosis Information:  · Diagnosis: Right sided sciatica (M54.31 ICD-10-CM)  · Treatment Diagnosis: R LE pain  Insurance/Certification information:  PT Insurance Information: [employer] Medicare, [place] required, $40 copay  Physician Information:  Referring Practitioner: Luis Kirk MD MD Follow-up Visit: ?  Plan of care signed (Y/N): Y  Visit# / total visits:  6/ (06/15/2020 - 2020) + / (2020 - 2020)   Pain level: \"pulling\"/10 R buttock    Progress Note Due (10 visits or 30 days, whichever is less):    Recertification Note Due (End of POC or 90 days, whichever is less):      Subjective:  06/15/2020: Pt reports history of R sciatica >30+ years ago which resolved. About 6 months ago had issues with R hip and went to chiropractor a couple of times and got better. Pt notes after a long day of yard work in 2020 she felt pain R leg (sciatica) and had a hard time standing up straight. Pt reports pain is to R buttock, down posterior thigh and sometimes in her calf. Pt reports pain ranges from 2-5/10. Feels tingling R foot at times. Pt's Dr started her on prednisone 2020 and pt reports this is starting to decrease her pain and tingling although is still present. Pain is worse with standing, walking, kneeling, sitting for a length of time and doing too much and has limited her doing yard work and gardening. Pt is retired and PLOF no pain or limitations. 2020: Pt notes good tolerance to IE. Does note was feeling pretty good until increased standing in kitchen and up/down stairs a lot yesterday so can tell is more sore today. Tingling R side of R foot and ball of foot that comes with increased activity. Finished prednisone this past Friday and has noticed a slight increase in pain. 06/26/2020:  Tingling doesn't seem to be as much. Pain increased with increased kneeling (when at [employer]) or with prolonged walking/standing. Doesn't feel has had as much pain even without being on Prednisone this week. Is more aware of body mechanics when picking up items from lower surface and now has to work on implementing techniques discussed last visit.     06/29/2020: Pt reports that she feels she is 70% better overall. Pt reports that the tingling is subsiding as is not as intense. She did more weeding Saturday which she hadn't done in awhile and wore back brace which she felt helped. Did feel some soreness afterwards so maybe was a little too much.      07/01/2020:  Pt reports after left building after last visit was sore. Also feels tingling in foot. Did feel some in calf this AM along with increased pain R hip/buttock. 07/17/2020: Pt reports has been able to walk longer than previously without increased pain. However, after standing 10-15 mins can feel increased pain. Pain at worst 4/10 with standing then sits down to relieve pain. Pain now no longer into R calf. Pt reports is 80% improved with the exception of standing. Tingling R foot is not as strong and is not as often. Hasn't had to use ice/heat recently. 07/23/2020: \"I am doing great today\" but yesterday was standing a lot in kitchen and could feel more pain with that. Pain yesterday up to 5/10 with tingling in R foot. 07/27/2020:  Some days are better than others. Pt feels is 80% improved overall. Static standing (such as when talking or in line) is still where she gets complaints - about 10 mins. If is standing and moving it is better. 07/31/2020:  Pt reports did some lighter lifting since last seen and didn't feel \"too bad\" after. Previously would have had increased pain with this type of activity.         08/03/2020: Pt reports did more walking/standing over the weekend and could feel more pulling R alternating Cues for chest press with lat lunge                    Therapeutic Activities (40189):    Home Exercise Program:   Pt. demonstrated good understanding and knowledge of HEP. Written instructions provided. 06/15/2020: HS stretch with strap supine, sitting piriformis stretch, TrA isometric  06/22/2020:  TrA with march, sciatic n glides, SKT opp shoulder  06/26/2020: clamshell with band, TrA with bridges, wall push-ups with TrA  06/29/2020: pelvic tilts on exercise ball, sciatic n glides switch to supine and push-ups switch to on knees  07/23/2020: marches and LAQ on exercise ball  07/27/2020: ball squats on wall  07/31/2020: ant/lat lunges with chest press    Manual Treatments (76514): caution due to osteopenia     Modalities:  Add prn    Timed Code Treatment Minutes:  TE: 38    Total Treatment Minutes:  38    Treatment/Activity Tolerance:  [x] Patient tolerated treatment well [] Patient limited by fatigue  [] Patient limited by pain  [] Patient limited by other medical complications  [] Other:     Assessment: good tolerance to exercises today    Prognosis: [x] Good [] Fair  [] Poor    Patient Requires Follow-up: [x] Yes  [] No    Goals: assessed 07/17/2020  Short term goals  Time Frame for Short term goals: 3 weeks  Short term goal 1: Pt will demo good understanding and knowledge of initial HEP MET  Short term goal 2: Decreased pain 3/10 at worst to allow for improved tolerance to prolonged sitting nearly met  Long term goals  Time Frame for Long term goals : 5 weeks  Long term goal 1: Pt will demo good understanding and knowledge of HEP progressions ongoing  Long term goal 2: Decreased pain 1/10 at worst with rare paresthesias to allow for sitting tolerance as previous partially met  Long term goal 3: Trunk AROM WFLs, R hip PROM = L and R HS 90-90 = L without pain to allow for N gait pattern with tolerance as previous without increased pain partially met  Long term goal 4: Strength R hip abd 5/5 to allow for prolonged standing, yard work/gardening tasks as previous without increased pain partially met  Long term goal 5: Decreased impairment per LEFS <10% impaired not met    Plan:   [x] Continue per plan of care [] Alter current plan (see comments)  [] Plan of care initiated [] Hold pending MD visit [] Discharge    Plan for Next Session:  Review HEP, add exercises as tolerated    Electronically signed by:   Daija Painter DPT [id number]

## 2020-08-05 ENCOUNTER — APPOINTMENT (OUTPATIENT)
Dept: PHYSICAL THERAPY | Age: 75
End: 2020-08-05
Payer: MEDICARE

## 2020-08-06 ENCOUNTER — TELEPHONE (OUTPATIENT)
Dept: FAMILY MEDICINE CLINIC | Age: 75
End: 2020-08-06

## 2020-08-06 ENCOUNTER — HOSPITAL ENCOUNTER (OUTPATIENT)
Dept: PHYSICAL THERAPY | Age: 75
Setting detail: THERAPIES SERIES
Discharge: HOME | End: 2020-08-06
Payer: MEDICARE

## 2020-08-06 PROCEDURE — 97110 THERAPEUTIC EXERCISES: CPT

## 2020-08-06 NOTE — PROGRESS NOTES
although is some better. Tingling R foot comes and goes. Trunk AROM WFLs with pulling, ext WFLs, B SB 25 deg with pulling on R with L SB. PROM R hip ER 45 deg, IR 40 deg, R HS 90-90 (-) 20 deg. Strength B hip abd 5/5. Pt concerned that she is still having continued complaints of pain/pulling/tingling. Progress towards goals:    Short term goals  Time Frame for Short term goals: 3 weeks  Short term goal 1: Pt will demo good understanding and knowledge of initial HEP MET  Short term goal 2: Decreased pain 3/10 at worst to allow for improved tolerance to prolonged sitting nearly met  Long term goals  Time Frame for Long term goals : 5 weeks  Long term goal 1: Pt will demo good understanding and knowledge of HEP progressions MET  Long term goal 2: Decreased pain 1/10 at worst with rare paresthesias to allow for sitting tolerance as previous partially met  Long term goal 3: Trunk AROM WFLs, R hip PROM = L and R HS 90-90 = L without pain to allow for N gait pattern with tolerance as previous without increased pain partially met  Long term goal 4: Strength R hip abd 5/5 to allow for prolonged standing, yard work/gardening tasks as previous without increased pain strength met but prolonged standing with increased pain yet  Long term goal 5: Decreased impairment per LEFS <10% impaired not met    Frequency/Duration:  # Days per week: [x] 1 day # Weeks: [] 1 week [] 4 weeks      [x] 2 days? [] 2 weeks [] 5 weeks      [] 3 days   [x] 3 weeks [] 6 weeks     Rehab Potential: [] Excellent [x] Good [] Fair  [] Poor     Goal Status:  [] Achieved [x] Partially Achieved  [] Not Achieved     Patient Status: [] Continue per initial plan of Care     [x] Recommended pt contact Dr office for F/U due to continued complaints of pain/pulling/tingling. Overall is improved but still with lingering complaints which limit pt's standing tolerance. May require further testing/intervention per your discretion.   Please advise, thank you!       [] Additional visits requested, Please re-certify for additional visits:      Requested frequency/duration:   X/week for  weeks    Electronically signed by: Kristine Sanchez PT, DPT [id number]    If you have any questions or concerns, please don't hesitate to call.   Thank you for your referral.    Physician Signature:________________________________Date:__________________  By signing above, therapists plan is approved by physician

## 2020-08-06 NOTE — TELEPHONE ENCOUNTER
Patient calling to ask opinion about what to do after finished with physical therapy if still having back pain. She has two sessions left out of 13 total. She is feeling better but not as much improvement as anticipated with the physical therapy. She would like advice on what other options she may have.

## 2020-08-18 ENCOUNTER — HOSPITAL ENCOUNTER (OUTPATIENT)
Dept: PHYSICAL THERAPY | Age: 75
Setting detail: THERAPIES SERIES
Discharge: HOME | End: 2020-08-18
Payer: MEDICARE

## 2020-08-18 PROCEDURE — 97110 THERAPEUTIC EXERCISES: CPT

## 2020-08-18 RX ORDER — LEVOTHYROXINE SODIUM 0.07 MG/1
TABLET ORAL
Qty: 90 TABLET | Refills: 1 | Status: SHIPPED | OUTPATIENT
Start: 2020-08-18 | End: 2021-02-12

## 2020-08-18 NOTE — FLOWSHEET NOTE
06/26/2020:  Tingling doesn't seem to be as much. Pain increased with increased kneeling (when at [employer]) or with prolonged walking/standing. Doesn't feel has had as much pain even without being on Prednisone this week. Is more aware of body mechanics when picking up items from lower surface and now has to work on implementing techniques discussed last visit.     06/29/2020: Pt reports that she feels she is 70% better overall. Pt reports that the tingling is subsiding as is not as intense. She did more weeding Saturday which she hadn't done in awhile and wore back brace which she felt helped. Did feel some soreness afterwards so maybe was a little too much.      07/01/2020:  Pt reports after left building after last visit was sore. Also feels tingling in foot. Did feel some in calf this AM along with increased pain R hip/buttock. 07/17/2020: Pt reports has been able to walk longer than previously without increased pain. However, after standing 10-15 mins can feel increased pain. Pain at worst 4/10 with standing then sits down to relieve pain. Pain now no longer into R calf. Pt reports is 80% improved with the exception of standing. Tingling R foot is not as strong and is not as often. Hasn't had to use ice/heat recently. 07/23/2020: \"I am doing great today\" but yesterday was standing a lot in kitchen and could feel more pain with that. Pain yesterday up to 5/10 with tingling in R foot. 07/27/2020:  Some days are better than others. Pt feels is 80% improved overall. Static standing (such as when talking or in line) is still where she gets complaints - about 10 mins. If is standing and moving it is better. 07/31/2020:  Pt reports did some lighter lifting since last seen and didn't feel \"too bad\" after. Previously would have had increased pain with this type of activity.         08/03/2020: Pt reports did more walking/standing over the weekend and could feel more pulling R HS stretch with strap 10-15\" x 3 B    Sitting piriformis fig 4 stretch, push knee down, lean fwd 10-15\" x 3 each    TrA isometric 5\" x 10    Sciatic n glides R  3\" X 10 Supine in HS stretch position with DF/PF     SKT opp shoulder stretch 15\" x 3 each    TrA with march 3\" x 10 each    Clamshell, red 3\" x 10 each    TrA with bridge 5\" x 10 Able to go full      Pelvic tilts sitting on large exercise ball X 10 each way  Fwd/bkwd, side to side, circles each way    Marches on exercise ball 2\" x 10 each alternating    LAQ on exercise ball 2\" x 10 each alternating    Squat on ball on wall with TrA 3-5\" x 10    Fwd/lat lunges with chest press holding purse X 5 each B alternating Cues for chest press with lat lunge   Modified plank on knees/elbows 3 x 30\" With TrA               Therapeutic Activities (76915):  encouraged stretching after prolonged sitting before standing and stretching prior to longer walks  Home Exercise Program:   Pt. demonstrated good understanding and knowledge of HEP. Written instructions provided. 06/15/2020: HS stretch with strap supine, sitting piriformis stretch, TrA isometric  06/22/2020:  TrA with march, sciatic n glides, SKT opp shoulder  06/26/2020: clamshell with band, TrA with bridges, wall push-ups with TrA  06/29/2020: pelvic tilts on exercise ball, sciatic n glides switch to supine and push-ups switch to on knees  07/23/2020: marches and LAQ on exercise ball  07/27/2020: ball squats on wall  07/31/2020: ant/lat lunges with chest press  08/18/2020: modified plank    Manual Treatments (80866): caution due to osteopenia     Modalities:  Add prn    Timed Code Treatment Minutes:  TE: 30, TA: 3    Total Treatment Minutes:  33    Treatment/Activity Tolerance:  [x] Patient tolerated treatment well [] Patient limited by fatigue  [] Patient limited by pain  [] Patient limited by other medical complications  [] Other:     Assessment: good tolerance to exercises today    Prognosis: [x] Good [] Fair  [] Poor    Patient Requires Follow-up: [x] Yes  [] No    Goals: assessed 08/06/2020  Short term goals  Time Frame for Short term goals: 3 weeks  Short term goal 1: Pt will demo good understanding and knowledge of initial HEP MET  Short term goal 2: Decreased pain 3/10 at worst to allow for improved tolerance to prolonged sitting nearly met  Long term goals  Time Frame for Long term goals : 5 weeks  Long term goal 1: Pt will demo good understanding and knowledge of HEP progressions MET  Long term goal 2: Decreased pain 1/10 at worst with rare paresthesias to allow for sitting tolerance as previous partially met  Long term goal 3: Trunk AROM WFLs, R hip PROM = L and R HS 90-90 = L without pain to allow for N gait pattern with tolerance as previous without increased pain partially met  Long term goal 4: Strength R hip abd 5/5 to allow for prolonged standing, yard work/gardening tasks as previous without increased pain strength met but prolonged standing with increased pain yet  Long term goal 5: Decreased impairment per LEFS <10% impaired not met    Plan:   [x] Continue per plan of care [] Alter current plan (see comments)  [] Plan of care initiated [] Hold pending MD visit [] Discharge    Plan for Next Session:  Review HEP, add exercises as tolerated    Electronically signed by:   Sangeeta Cherry DPT [id number]

## 2020-08-20 ENCOUNTER — HOSPITAL ENCOUNTER (OUTPATIENT)
Dept: PHYSICAL THERAPY | Age: 75
Setting detail: THERAPIES SERIES
Discharge: HOME | End: 2020-08-20
Payer: MEDICARE

## 2020-08-20 PROCEDURE — 97110 THERAPEUTIC EXERCISES: CPT

## 2020-08-20 NOTE — PROGRESS NOTES
The Lower Extremity Functional Scale    Patient: Yung Babin  : 1945  MRN: [id number]  Date: 2020  Electronically Signed by: Kunal Rousseau PT, DPT [id number]     We are interested in knowing whether you are having any difficulty at all with the activities listed below because of your lower limb problem for which you are currently seeking attention. Please provide an answer for each activity.          Today would you have difficulty at all with:    Activities Extreme Difficulty or Unable to Perform Activity Quite a Bit of Difficulty Moderate Difficulty A Little Bit of Difficulty No Difficulty   1 Any of your usual work, housework, or school activities []0 []1 []2  []3 [x]4   2 Your usual hobbies, recreational, or sporting activities []0 []1 []2 []3 [x]4   3 Getting into or out of the bath []0 []1 []2 []3 [x]4   4 Walking between rooms []0 []1 []2 []3 [x]4   5 Putting on your shoes or socks []0 []1 []2 []3 [x]4   6 Squatting []0 []1 []2 []3 [x]4   7 Lifting an object, like a bag of groceries from the floor []0 []1 []2 []3 [x]4   8 Performing light activities around your home []0 []1 []2 []3 [x]4   9 Performing heavy activities around your home []0 []1 []2 [x]3 []4   10 Getting into or out of a car []0 []1 []2 [x]3 []4   11 Walking 2 blocks []0 []1 []2 []3 [x]4   12 Walking a mile []0 []1 []2 [x]3 []4   13 Going up or down 10 stairs (about 1 flight of stairs) []0 []1 []2 []3 [x]4   14 Standing for 1 hour []0 []1 []2 [x]3 []4   15 Sitting for 1 hour []0 []1 []2 [x]3 []4   16 Running on even ground  []0 []1 [x]2 []3 []4   17 Running on uneven ground  []0 []1 [x]2 []3 []4   18 Making sharp turns while running fast  []0 []1 [x]2 []3 []4   19 Hopping []0 []1 []2 [x]3 []4   20 Rolling over in bed []0 []1 []2 []3 [x]4    Column Totals:          Patient Score from Above:  68/80    (Patient Score / 80) X 100:  85%                          Scoring Method for Lower Extremity Functional Scale    The Lower

## 2020-08-20 NOTE — FLOWSHEET NOTE
Physical Therapy Daily Treatment Note  Date:  2020    Patient Name:  Mahad Boyd    :  1945  MRN: [id number]  Restrictions/Precautions:  osteopenia   Medical/Treatment Diagnosis Information:  · Diagnosis: Right sided sciatica (M54.31 ICD-10-CM)  · Treatment Diagnosis: R LE pain  Insurance/Certification information:  PT Insurance Information: [employer] 150 [place] required, $40 copay  Physician Information:  Referring Practitioner: Alecia Macdonald MD MD Follow-up Visit: ?  Plan of care signed (Y/N): Y  Visit# / total visits:   (06/15/2020 - 2020) +  (2020 - 2020)   Pain level: \"pulling\"/10 R buttock    Progress Note Due (10 visits or 30 days, whichever is less):    Recertification Note Due (End of POC or 90 days, whichever is less):      Subjective:  06/15/2020: Pt reports history of R sciatica >30+ years ago which resolved. About 6 months ago had issues with R hip and went to chiropractor a couple of times and got better. Pt notes after a long day of yard work in 2020 she felt pain R leg (sciatica) and had a hard time standing up straight. Pt reports pain is to R buttock, down posterior thigh and sometimes in her calf. Pt reports pain ranges from 2-5/10. Feels tingling R foot at times. Pt's Dr started her on prednisone 2020 and pt reports this is starting to decrease her pain and tingling although is still present. Pain is worse with standing, walking, kneeling, sitting for a length of time and doing too much and has limited her doing yard work and gardening. Pt is retired and PLOF no pain or limitations. 2020: Pt notes good tolerance to IE. Does note was feeling pretty good until increased standing in kitchen and up/down stairs a lot yesterday so can tell is more sore today. Tingling R side of R foot and ball of foot that comes with increased activity. Finished prednisone this past Friday and has noticed a slight increase in pain. 06/26/2020:  Tingling doesn't seem to be as much. Pain increased with increased kneeling (when at [employer]) or with prolonged walking/standing. Doesn't feel has had as much pain even without being on Prednisone this week. Is more aware of body mechanics when picking up items from lower surface and now has to work on implementing techniques discussed last visit.     06/29/2020: Pt reports that she feels she is 70% better overall. Pt reports that the tingling is subsiding as is not as intense. She did more weeding Saturday which she hadn't done in awhile and wore back brace which she felt helped. Did feel some soreness afterwards so maybe was a little too much.      07/01/2020:  Pt reports after left building after last visit was sore. Also feels tingling in foot. Did feel some in calf this AM along with increased pain R hip/buttock. 07/17/2020: Pt reports has been able to walk longer than previously without increased pain. However, after standing 10-15 mins can feel increased pain. Pain at worst 4/10 with standing then sits down to relieve pain. Pain now no longer into R calf. Pt reports is 80% improved with the exception of standing. Tingling R foot is not as strong and is not as often. Hasn't had to use ice/heat recently. 07/23/2020: \"I am doing great today\" but yesterday was standing a lot in kitchen and could feel more pain with that. Pain yesterday up to 5/10 with tingling in R foot. 07/27/2020:  Some days are better than others. Pt feels is 80% improved overall. Static standing (such as when talking or in line) is still where she gets complaints - about 10 mins. If is standing and moving it is better. 07/31/2020:  Pt reports did some lighter lifting since last seen and didn't feel \"too bad\" after. Previously would have had increased pain with this type of activity.         08/03/2020: Pt reports did more walking/standing over the weekend and could feel more pulling R LE with getting in/out of car (if sat awhile did improve). Also a little tingling in R foot over the weekend and today. 08/06/2020: Pain at worst 4/10 R hip/thigh, still feels pulling/pain with prolonged standing, although is some better. Tingling R foot comes and goes. 08/18/2020:  Walked around football field earlier today and felt a pull in leg and tingle in foot. However, was able to stand at pharmacy to  meds for  and pt reports previously wouldn't have been able to do that. Feels she \"turned a corner. \"  Pt states did not see Dr but would just finish out therapy first.        08/20/2020: Discomfort 2-3/10 at worst lower R buttock to mid R thigh, no longer in calf. Tingling is a lot lighter R foot but comes and goes. Overall feels 90% improved from IE. Standing > 10 mins still makes pt want to sit down. Objective:06/15/2020   Observation:   Palpation: mild tenderness R buttock  Observation: Pt ambulates with mild antalgia with short distances today      Test measurements:     PROM RLE (degrees)  RLE General PROM: hip flexion WFLs, ER 40 deg, IR 30 deg  PROM LLE (degrees)  LLE General PROM: hip flexion WFLs, ER 45 deg, IR 40 deg  Spine  Lumbar: flexion WFLs with pain upon return, ext 25% with pain, L SB 25 deg, R SB 20 deg     Strength Other  Other: B hip flexion 5/5, B knees 5/5, B DF 5/5, L hip abd 5/5, R hip abd 4/5, able to heel and toe walk  Additional Measures  Flexibility: L 90-90 HS (-) 15 deg, R (-) 30   Special Tests: (+) SLR R, (-) L  Other: decreased R ankle reflex 1+ vs L 2+, LEFS = 35% impaired  Sensation  Overall Sensation Status: (intact to light touch)    07/17/2020: Trunk AROM WFLs flexion without pain upon return, ext 50% without pain, B SB 25 deg with mild pain with return from R SB. PROM R hip ER 45 deg, IR 45 deg. R HS 90-90 (-) 20 deg. Strength R hip abd 4+/5. LEFS = 44% impaired.   08/06/2020: Trunk AROM WFLs with pulling, ext WFLs, B SB 25 deg with pulling on R with L SB. PROM R hip ER 45 deg, IR 40 deg, R HS 90-90 (-) 20 deg. Strength B hip abd 5/5.    08/20/2020: Trunk AROM flexion WFLs with pulling, ext WFLs, B SB 25 deg. PROM R hip ER 50 deg, IR 40 deg, R HS 90-90 (-) 20 deg. LEFS = 15% impaired. Exercises, Neuro Facilitation & Gait Training (01857, [id number]): Activity Resistance/Repetitions Other comments   Supine HS stretch with strap 10-15\" x 3 B    Sitting piriformis fig 4 stretch, push knee down, lean fwd 10-15\" x 3 each    TrA isometric 5\" x 10    Sciatic n glides R  3\" X 10 Supine in HS stretch position with DF/PF     SKT opp shoulder stretch 15\" x 3 each    TrA with march 3\" x 10 each    Clamshell, red 3\" x 10 each    TrA with bridge 5\" x 10 Able to go full      Pelvic tilts sitting on large exercise ball X 10 each way  Fwd/bkwd, side to side, circles each way    Marches on exercise ball 2\" x 10 each alternating    LAQ on exercise ball 2\" x 10 each alternating    Squat on ball on wall with TrA 3-5\" x 10    Fwd/lat lunges with chest press holding purse X 5 each B alternating Cues for chest press with lat lunge   Modified plank on knees/elbows 3 x 30\" With TrA               Therapeutic Activities (28841):    Home Exercise Program:   Pt. demonstrated good understanding and knowledge of HEP. Written instructions provided. 06/15/2020: HS stretch with strap supine, sitting piriformis stretch, TrA isometric  06/22/2020:  TrA with march, sciatic n glides, SKT opp shoulder  06/26/2020: clamshell with band, TrA with bridges, wall push-ups with TrA  06/29/2020: pelvic tilts on exercise ball, sciatic n glides switch to supine and push-ups switch to on knees  07/23/2020: marches and LAQ on exercise ball  07/27/2020: ball squats on wall  07/31/2020: ant/lat lunges with chest press  08/18/2020: modified plank    Manual Treatments (01174): caution due to osteopenia     Modalities:  Add prn    Timed Code Treatment Minutes:  TE: 34    Total Treatment Minutes:  34    Treatment/Activity Tolerance:  [x] Patient tolerated treatment well [] Patient limited by fatigue  [] Patient limited by pain  [] Patient limited by other medical complications  [] Other:     Assessment: see goal assessment below    Prognosis: [x] Good [] Fair  [] Poor    Patient Requires Follow-up: [] Yes  [x] No    Goals: assessed 08/20/2020  Short term goals  Time Frame for Short term goals: 3 weeks  Short term goal 1: Pt will demo good understanding and knowledge of initial HEP MET  Short term goal 2: Decreased pain 3/10 at worst to allow for improved tolerance to prolonged sitting MET  Long term goals  Time Frame for Long term goals : 5 weeks  Long term goal 1: Pt will demo good understanding and knowledge of HEP progressions MET  Long term goal 2: Decreased pain 1/10 at worst with rare paresthesias to allow for sitting tolerance as previous partially met  Long term goal 3: Trunk AROM WFLs, R hip PROM = L and R HS 90-90 = L without pain to allow for N gait pattern with tolerance as previous without increased pain nearly met  Long term goal 4: Strength R hip abd 5/5 to allow for prolonged standing, yard work/gardening tasks as previous without increased pain strength met but prolonged standing with increased pain yet  Long term goal 5: Decreased impairment per LEFS <10% impaired nearly met    Plan:   [] Continue per plan of care [] Alter current plan (see comments)  [] Plan of care initiated [] Hold pending MD visit [x] Discharge    Plan for Next Session:  D/C PT    Electronically signed by:   Niru Farias DPT [id number]

## 2020-08-25 ENCOUNTER — APPOINTMENT (OUTPATIENT)
Dept: PHYSICAL THERAPY | Age: 75
End: 2020-08-25
Payer: MEDICARE

## 2020-11-25 NOTE — FLOWSHEET NOTE
Patient presented to clinic for nurse blood pressure check.  Initial reading was 128/90.  After sitting for ten minutes his blood pressure was 122/94.  Please advise.   Physical Therapy Daily Treatment Note  Date:  2020    Patient Name:  Winnie Norwood    :  1945  MRN: [id number]  Restrictions/Precautions:  osteopenia   Medical/Treatment Diagnosis Information:  · Diagnosis: Right sided sciatica (M54.31 ICD-10-CM)  · Treatment Diagnosis: R LE pain  Insurance/Certification information:  PT Insurance Information: [employer] 150 [place] required, $40 copay  Physician Information:  Referring Practitioner: Guido Hansen MD MD Follow-up Visit: ?  Plan of care signed (Y/N): Y  Visit# / total visits:   (06/15/2020 - 2020) +  (2020 - 2020)   Pain level: \"pulling\"/10 R buttock    Progress Note Due (10 visits or 30 days, whichever is less):    Recertification Note Due (End of POC or 90 days, whichever is less):      Subjective:  06/15/2020: Pt reports history of R sciatica >30+ years ago which resolved. About 6 months ago had issues with R hip and went to chiropractor a couple of times and got better. Pt notes after a long day of yard work in 2020 she felt pain R leg (sciatica) and had a hard time standing up straight. Pt reports pain is to R buttock, down posterior thigh and sometimes in her calf. Pt reports pain ranges from 2-5/10. Feels tingling R foot at times. Pt's Dr started her on prednisone 2020 and pt reports this is starting to decrease her pain and tingling although is still present. Pain is worse with standing, walking, kneeling, sitting for a length of time and doing too much and has limited her doing yard work and gardening. Pt is retired and PLOF no pain or limitations. 2020: Pt notes good tolerance to IE. Does note was feeling pretty good until increased standing in kitchen and up/down stairs a lot yesterday so can tell is more sore today. Tingling R side of R foot and ball of foot that comes with increased activity. Finished prednisone this past Friday and has noticed a slight increase in pain. 06/26/2020:  Tingling doesn't seem to be as much. Pain increased with increased kneeling (when at [employer]) or with prolonged walking/standing. Doesn't feel has had as much pain even without being on Prednisone this week. Is more aware of body mechanics when picking up items from lower surface and now has to work on implementing techniques discussed last visit.     06/29/2020: Pt reports that she feels she is 70% better overall. Pt reports that the tingling is subsiding as is not as intense. She did more weeding Saturday which she hadn't done in awhile and wore back brace which she felt helped. Did feel some soreness afterwards so maybe was a little too much.      07/01/2020:  Pt reports after left building after last visit was sore. Also feels tingling in foot. Did feel some in calf this AM along with increased pain R hip/buttock. 07/17/2020: Pt reports has been able to walk longer than previously without increased pain. However, after standing 10-15 mins can feel increased pain. Pain at worst 4/10 with standing then sits down to relieve pain. Pain now no longer into R calf. Pt reports is 80% improved with the exception of standing. Tingling R foot is not as strong and is not as often. Hasn't had to use ice/heat recently. 07/23/2020: \"I am doing great today\" but yesterday was standing a lot in kitchen and could feel more pain with that. Pain yesterday up to 5/10 with tingling in R foot. 07/27/2020:  Some days are better than others. Pt feels is 80% improved overall. Static standing (such as when talking or in line) is still where she gets complaints - about 10 mins. If is standing and moving it is better. 07/31/2020:  Pt reports did some lighter lifting since last seen and didn't feel \"too bad\" after. Previously would have had increased pain with this type of activity.         08/03/2020: Pt reports did more walking/standing over the weekend and could feel more pulling R LE with getting in/out of car (if sat awhile did improve). Also a little tingling in R foot over the weekend and today. 08/06/2020: Pain at worst 4/10 R hip/thigh, still feels pulling/pain with prolonged standing, although is some better. Tingling R foot comes and goes. Objective:06/15/2020   Observation:   Palpation: mild tenderness R buttock  Observation: Pt ambulates with mild antalgia with short distances today      Test measurements:     PROM RLE (degrees)  RLE General PROM: hip flexion WFLs, ER 40 deg, IR 30 deg  PROM LLE (degrees)  LLE General PROM: hip flexion WFLs, ER 45 deg, IR 40 deg  Spine  Lumbar: flexion WFLs with pain upon return, ext 25% with pain, L SB 25 deg, R SB 20 deg     Strength Other  Other: B hip flexion 5/5, B knees 5/5, B DF 5/5, L hip abd 5/5, R hip abd 4/5, able to heel and toe walk  Additional Measures  Flexibility: L 90-90 HS (-) 15 deg, R (-) 30   Special Tests: (+) SLR R, (-) L  Other: decreased R ankle reflex 1+ vs L 2+, LEFS = 35% impaired  Sensation  Overall Sensation Status: (intact to light touch)    07/17/2020: Trunk AROM WFLs flexion without pain upon return, ext 50% without pain, B SB 25 deg with mild pain with return from R SB. PROM R hip ER 45 deg, IR 45 deg. R HS 90-90 (-) 20 deg. Strength R hip abd 4+/5. LEFS = 44% impaired. 08/06/2020: Trunk AROM WFLs with pulling, ext WFLs, B SB 25 deg with pulling on R with L SB. PROM R hip ER 45 deg, IR 40 deg, R HS 90-90 (-) 20 deg. Strength B hip abd 5/5. Exercises, Neuro Facilitation & Gait Training (34654, [id number]):   Activity Resistance/Repetitions Other comments   Supine HS stretch with strap 10-15\" x 3 B    Sitting piriformis fig 4 stretch, push knee down, lean fwd 10-15\" x 3 each    TrA isometric 5\" x 10    Sciatic n glides R  3\" X 10 Supine in HS stretch position with DF/PF     SKT opp shoulder stretch 15\" x 3 each    TrA with march 3\" x 10 each    Clamshell, red 3\" x 10 each    TrA with bridge 5\" x 10 Able to go full      Pelvic tilts sitting on large exercise ball X 10 each way  Fwd/bkwd, side to side, circles each way    Marches on exercise ball 2\" x 10 each alternating    LAQ on exercise ball 2\" x 10 each alternating    Squat on ball on wall with TrA 3-5\" x 10    Fwd/lat lunges with chest press holding purse X 5 each B alternating Cues for chest press with lat lunge                    Therapeutic Activities (65877):    Home Exercise Program:   Pt. demonstrated good understanding and knowledge of HEP. Written instructions provided. 06/15/2020: HS stretch with strap supine, sitting piriformis stretch, TrA isometric  06/22/2020:  TrA with march, sciatic n glides, SKT opp shoulder  06/26/2020: clamshell with band, TrA with bridges, wall push-ups with TrA  06/29/2020: pelvic tilts on exercise ball, sciatic n glides switch to supine and push-ups switch to on knees  07/23/2020: marches and LAQ on exercise ball  07/27/2020: ball squats on wall  07/31/2020: ant/lat lunges with chest press    Manual Treatments (35552): caution due to osteopenia     Modalities:  Add prn    Timed Code Treatment Minutes:  TE: 39    Total Treatment Minutes:  39    Treatment/Activity Tolerance:  [x] Patient tolerated treatment well [] Patient limited by fatigue  [] Patient limited by pain  [] Patient limited by other medical complications  [] Other:     Assessment: good tolerance to exercises today    Prognosis: [x] Good [] Fair  [] Poor    Patient Requires Follow-up: [x] Yes  [] No    Goals: assessed 08/06/2020  Short term goals  Time Frame for Short term goals: 3 weeks  Short term goal 1: Pt will demo good understanding and knowledge of initial HEP MET  Short term goal 2: Decreased pain 3/10 at worst to allow for improved tolerance to prolonged sitting nearly met  Long term goals  Time Frame for Long term goals : 5 weeks  Long term goal 1: Pt will demo good understanding and knowledge of HEP progressions MET  Long term goal 2: Decreased pain 1/10 at worst with rare paresthesias to allow for sitting tolerance as previous partially met  Long term goal 3: Trunk AROM WFLs, R hip PROM = L and R HS 90-90 = L without pain to allow for N gait pattern with tolerance as previous without increased pain partially met  Long term goal 4: Strength R hip abd 5/5 to allow for prolonged standing, yard work/gardening tasks as previous without increased pain strength met but prolonged standing with increased pain yet  Long term goal 5: Decreased impairment per LEFS <10% impaired not met    Plan:   [x] Continue per plan of care [] Alter current plan (see comments)  [] Plan of care initiated [] Hold pending MD visit [] Discharge    Plan for Next Session:  Review HEP, add exercises as tolerated    Electronically signed by:   Jose R Ledezma DPT [id number]

## 2021-02-12 RX ORDER — LEVOTHYROXINE SODIUM 0.07 MG/1
TABLET ORAL
Qty: 90 TABLET | Refills: 1 | Status: SHIPPED | OUTPATIENT
Start: 2021-02-12 | End: 2021-08-06

## 2021-03-16 ENCOUNTER — OFFICE VISIT (OUTPATIENT)
Dept: FAMILY MEDICINE CLINIC | Age: 76
End: 2021-03-16
Payer: MEDICARE

## 2021-03-16 VITALS
HEART RATE: 67 BPM | HEIGHT: 64 IN | DIASTOLIC BLOOD PRESSURE: 70 MMHG | TEMPERATURE: 97.2 F | SYSTOLIC BLOOD PRESSURE: 142 MMHG | OXYGEN SATURATION: 97 % | BODY MASS INDEX: 19.39 KG/M2 | RESPIRATION RATE: 16 BRPM | WEIGHT: 113.6 LBS

## 2021-03-16 DIAGNOSIS — Z12.11 SCREENING FOR COLON CANCER: ICD-10-CM

## 2021-03-16 DIAGNOSIS — R53.83 FATIGUE, UNSPECIFIED TYPE: ICD-10-CM

## 2021-03-16 DIAGNOSIS — E28.39 ESTROGEN DEFICIENCY: ICD-10-CM

## 2021-03-16 DIAGNOSIS — E78.00 PURE HYPERCHOLESTEROLEMIA: ICD-10-CM

## 2021-03-16 DIAGNOSIS — E03.9 ACQUIRED HYPOTHYROIDISM: Primary | ICD-10-CM

## 2021-03-16 DIAGNOSIS — H90.6 MIXED CONDUCTIVE AND SENSORINEURAL HEARING LOSS OF BOTH EARS: ICD-10-CM

## 2021-03-16 DIAGNOSIS — Z12.31 ENCOUNTER FOR SCREENING MAMMOGRAM FOR MALIGNANT NEOPLASM OF BREAST: ICD-10-CM

## 2021-03-16 DIAGNOSIS — M19.042 PRIMARY OSTEOARTHRITIS OF BOTH HANDS: ICD-10-CM

## 2021-03-16 DIAGNOSIS — M85.89 OSTEOPENIA OF MULTIPLE SITES: ICD-10-CM

## 2021-03-16 DIAGNOSIS — M54.31 SCIATICA OF RIGHT SIDE: ICD-10-CM

## 2021-03-16 DIAGNOSIS — M19.041 PRIMARY OSTEOARTHRITIS OF BOTH HANDS: ICD-10-CM

## 2021-03-16 PROCEDURE — 99214 OFFICE O/P EST MOD 30 MIN: CPT | Performed by: INTERNAL MEDICINE

## 2021-03-16 ASSESSMENT — ENCOUNTER SYMPTOMS
GASTROINTESTINAL NEGATIVE: 1
EYES NEGATIVE: 1
ALLERGIC/IMMUNOLOGIC NEGATIVE: 1
RESPIRATORY NEGATIVE: 1

## 2021-03-16 ASSESSMENT — PATIENT HEALTH QUESTIONNAIRE - PHQ9
SUM OF ALL RESPONSES TO PHQ9 QUESTIONS 1 & 2: 1
2. FEELING DOWN, DEPRESSED OR HOPELESS: 0
1. LITTLE INTEREST OR PLEASURE IN DOING THINGS: 1
SUM OF ALL RESPONSES TO PHQ QUESTIONS 1-9: 1
SUM OF ALL RESPONSES TO PHQ QUESTIONS 1-9: 1

## 2021-03-16 NOTE — ASSESSMENT & PLAN NOTE
The most affected joints are hands. No new c/o of pain ,decrease ROM , or dysfunction of affect joints since last visit. . NO effusion or erythema since last visit.  OTCs rarely

## 2021-03-16 NOTE — PATIENT INSTRUCTIONS
Annual Physical Exam. Due for DEXA,Mammogram, Cologuard, Audiologist.      Pure Hypercholesterolemia. Will do labs and call if need tx. Osteopenia of Multiple Sites. DEXA ordered. Primary Osteoarthritis of Both Hands. OTC if needed. Call if new c/o. Acquired Hypothyroidism. Will do labs and call if need to adjust med. Sciatica of Right Side. Continue exercise and stretching.

## 2021-03-16 NOTE — ASSESSMENT & PLAN NOTE
Mammo: 2017. (mother in [de-identified])  Pap: 10/2015. No further visit per Gyn  Colonoscopy: Cologuard given. DEXA: 2013. Ordered. Eye: appt 5/2021  Hearing: decreased hearing. Immunnization: Discussed Pneumonia vaccines. Shingrix discussed. MMSE: 30/30  Get Up and Go: passed  Tob: nonsmoker/never  ETOH: 1 glass wine daily.    Caffeine: moderate am  Cardiac Risk Assessment: labs pending  Living will:  yes  Medical power of : yes

## 2021-03-16 NOTE — ASSESSMENT & PLAN NOTE
Sciatica R side not improving w/ Stretching and meds. Has pseudoclaudication w/ walking. Loss or R ankle reflex. Started after working in yard. Much improved after PT. No reoccurrence in last 8 mo.

## 2021-03-16 NOTE — PROGRESS NOTES
Subjective:      Patient ID: Praveen Rolon is a 76 y.o. female. HPI  Annual physical exam  Mammo: 2017. (mother in [de-identified])  Pap: 10/2015. No further visit per Gyn  Colonoscopy: Cologuard given. DEXA: 2013. Ordered. Eye: appt 5/2021  Hearing: decreased hearing. Immunnization: Discussed Pneumonia vaccines. Shingrix discussed. MMSE: 30/30  Get Up and Go: passed  Tob: nonsmoker/never  ETOH: 1 glass wine daily. Caffeine: moderate am  Cardiac Risk Assessment: labs pending  Living will:  yes  Medical power of : yes    Acquired hypothyroidism  No increased fatigue,wt loss/gain,hair loss, dry skin, depression, muscle pain, or heart palpatations. No change in med dose since last visit. No c/o w/ meds. On 88 mcg. Pure hypercholesterolemia  Elevated LDL in past. No new c/o. Diet good, wt stable. Regular exercise. Osteopenia of multiple sites  DEXA ordered. Primary osteoarthritis of both hands   The most affected joints are hands. No new c/o of pain ,decrease ROM , or dysfunction of affect joints since last visit. . NO effusion or erythema since last visit. OTCs rarely    Sciatica of right side  Sciatica R side not improving w/ Stretching and meds. Has pseudoclaudication w/ walking. Loss or R ankle reflex. Started after working in yard. Much improved after PT. No reoccurrence in last 8 mo. Review of Systems   Constitutional: Negative. HENT: Positive for hearing loss. Eyes: Negative. Respiratory: Negative. Cardiovascular: Negative. Gastrointestinal: Negative. Endocrine: Negative. Genitourinary: Negative. Musculoskeletal: Positive for arthralgias. Skin: Negative. Allergic/Immunologic: Negative. Neurological: Negative. Hematological: Negative. Psychiatric/Behavioral: Positive for sleep disturbance. Objective:   Physical Exam  Constitutional:       General: She is not in acute distress. Appearance: Normal appearance.  She is well-developed and normal Popliteal pulses are 2+ on the right side and 2+ on the left side. Dorsalis pedis pulses are 2+ on the right side and 2+ on the left side. Posterior tibial pulses are 2+ on the right side and 2+ on the left side. Heart sounds: Normal heart sounds. No murmur. No friction rub. No gallop. Pulmonary:      Effort: Pulmonary effort is normal. No respiratory distress. Breath sounds: Normal breath sounds. No stridor. No decreased breath sounds, wheezing, rhonchi or rales. Chest:      Chest wall: No tenderness. Abdominal:      General: Bowel sounds are normal. There is no distension or abdominal bruit. Palpations: Abdomen is soft. There is no shifting dullness, fluid wave, mass or pulsatile mass. Tenderness: There is no abdominal tenderness. There is no right CVA tenderness, left CVA tenderness, guarding or rebound. Hernia: No hernia is present. There is no hernia in the ventral area. Genitourinary:     Exam position: Supine. Comments: ne  Musculoskeletal: Normal range of motion. General: Tenderness (OA hands moderate.) present. No swelling, deformity or signs of injury. Right lower leg: No edema. Left lower leg: No edema. Lymphadenopathy:      Head:      Right side of head: No submental, submandibular, tonsillar, preauricular, posterior auricular or occipital adenopathy. Left side of head: No submental, submandibular, tonsillar, preauricular, posterior auricular or occipital adenopathy. Cervical: No cervical adenopathy. Upper Body:      Right upper body: No supraclavicular or epitrochlear adenopathy. Left upper body: No supraclavicular or epitrochlear adenopathy. Skin:     General: Skin is warm and dry. Capillary Refill: Capillary refill takes less than 2 seconds. Coloration: Skin is not jaundiced or pale. Findings: No abrasion, bruising, erythema, lesion or rash. Nails: There is no clubbing.      Neurological: General: No focal deficit present. Mental Status: She is alert and oriented to person, place, and time. Mental status is at baseline. She is not disoriented. Cranial Nerves: No cranial nerve deficit. Sensory: No sensory deficit. Motor: No weakness, tremor, atrophy, abnormal muscle tone or seizure activity. Coordination: Coordination normal.      Gait: Gait normal.      Deep Tendon Reflexes: Reflexes are normal and symmetric. Reflexes normal. Babinski sign absent on the right side. Babinski sign absent on the left side. Reflex Scores:       Tricep reflexes are 2+ on the right side and 2+ on the left side. Bicep reflexes are 2+ on the right side and 2+ on the left side. Brachioradialis reflexes are 2+ on the right side and 2+ on the left side. Patellar reflexes are 2+ on the right side and 2+ on the left side. Achilles reflexes are 2+ on the right side and 2+ on the left side. Psychiatric:         Mood and Affect: Mood normal.         Speech: Speech normal.         Behavior: Behavior normal.         Thought Content: Thought content normal.         Judgment: Judgment normal.         Assessment / Plan:     Annual Physical Exam. Due for DEXA,Mammogram, Cologuard, Audiologist.      Pure Hypercholesterolemia. Will do labs and call if need tx. Osteopenia of Multiple Sites. DEXA ordered. Primary Osteoarthritis of Both Hands. OTC if needed. Call if new c/o. Acquired Hypothyroidism. Will do labs and call if need to adjust med. Sciatica of Right Side. Continue exercise and stretching.

## 2021-03-17 DIAGNOSIS — M85.89 OSTEOPENIA OF MULTIPLE SITES: ICD-10-CM

## 2021-03-17 DIAGNOSIS — R53.83 FATIGUE, UNSPECIFIED TYPE: ICD-10-CM

## 2021-03-17 DIAGNOSIS — E03.9 ACQUIRED HYPOTHYROIDISM: ICD-10-CM

## 2021-03-17 DIAGNOSIS — M19.042 PRIMARY OSTEOARTHRITIS OF BOTH HANDS: ICD-10-CM

## 2021-03-17 DIAGNOSIS — M19.041 PRIMARY OSTEOARTHRITIS OF BOTH HANDS: ICD-10-CM

## 2021-03-17 DIAGNOSIS — M54.31 SCIATICA OF RIGHT SIDE: ICD-10-CM

## 2021-03-17 DIAGNOSIS — E78.00 PURE HYPERCHOLESTEROLEMIA: ICD-10-CM

## 2021-03-17 LAB
ALBUMIN SERPL-MCNC: 4.3 G/DL (ref 3.4–5)
ALP BLD-CCNC: 51 U/L (ref 40–129)
ALT SERPL-CCNC: 21 U/L (ref 10–40)
ANION GAP SERPL CALCULATED.3IONS-SCNC: 7 MMOL/L (ref 3–16)
AST SERPL-CCNC: 20 U/L (ref 15–37)
BASOPHILS ABSOLUTE: 0 K/UL (ref 0–0.2)
BASOPHILS RELATIVE PERCENT: 0.8 %
BILIRUB SERPL-MCNC: 0.7 MG/DL (ref 0–1)
BILIRUBIN DIRECT: <0.2 MG/DL (ref 0–0.3)
BILIRUBIN, INDIRECT: NORMAL MG/DL (ref 0–1)
BUN BLDV-MCNC: 12 MG/DL (ref 7–20)
CALCIUM SERPL-MCNC: 8.9 MG/DL (ref 8.3–10.6)
CHLORIDE BLD-SCNC: 99 MMOL/L (ref 99–110)
CHOLESTEROL, TOTAL: 199 MG/DL (ref 0–199)
CO2: 28 MMOL/L (ref 21–32)
CREAT SERPL-MCNC: 0.5 MG/DL (ref 0.6–1.2)
EOSINOPHILS ABSOLUTE: 0.1 K/UL (ref 0–0.6)
EOSINOPHILS RELATIVE PERCENT: 2.1 %
GFR AFRICAN AMERICAN: >60
GFR NON-AFRICAN AMERICAN: >60
GLUCOSE BLD-MCNC: 95 MG/DL (ref 70–99)
HCT VFR BLD CALC: 41.7 % (ref 36–48)
HDLC SERPL-MCNC: 80 MG/DL (ref 40–60)
HEMOGLOBIN: 14.2 G/DL (ref 12–16)
LDL CHOLESTEROL CALCULATED: 105 MG/DL
LYMPHOCYTES ABSOLUTE: 2.1 K/UL (ref 1–5.1)
LYMPHOCYTES RELATIVE PERCENT: 43.1 %
MCH RBC QN AUTO: 33.7 PG (ref 26–34)
MCHC RBC AUTO-ENTMCNC: 34.1 G/DL (ref 31–36)
MCV RBC AUTO: 98.8 FL (ref 80–100)
MONOCYTES ABSOLUTE: 0.4 K/UL (ref 0–1.3)
MONOCYTES RELATIVE PERCENT: 8.6 %
NEUTROPHILS ABSOLUTE: 2.2 K/UL (ref 1.7–7.7)
NEUTROPHILS RELATIVE PERCENT: 45.4 %
PDW BLD-RTO: 13.5 % (ref 12.4–15.4)
PLATELET # BLD: 287 K/UL (ref 135–450)
PMV BLD AUTO: 8.2 FL (ref 5–10.5)
POTASSIUM SERPL-SCNC: 3.9 MMOL/L (ref 3.5–5.1)
RBC # BLD: 4.22 M/UL (ref 4–5.2)
SODIUM BLD-SCNC: 134 MMOL/L (ref 136–145)
TOTAL PROTEIN: 7 G/DL (ref 6.4–8.2)
TRIGL SERPL-MCNC: 72 MG/DL (ref 0–150)
TSH SERPL DL<=0.05 MIU/L-ACNC: 0.28 UIU/ML (ref 0.27–4.2)
VLDLC SERPL CALC-MCNC: 14 MG/DL
WBC # BLD: 4.9 K/UL (ref 4–11)

## 2021-03-19 ENCOUNTER — TELEPHONE (OUTPATIENT)
Dept: FAMILY MEDICINE CLINIC | Age: 76
End: 2021-03-19

## 2021-03-19 NOTE — TELEPHONE ENCOUNTER
Patient returned call regarding lab results of 3-17-21. I read her the physician results, she understood, no questions.

## 2021-04-06 ENCOUNTER — TELEPHONE (OUTPATIENT)
Dept: FAMILY MEDICINE CLINIC | Age: 76
End: 2021-04-06

## 2021-04-06 NOTE — TELEPHONE ENCOUNTER
BONITAI  --  I called patient and LM regarding ENT referral.  I received notice the ENT has attempted three times with no success.

## 2021-05-14 ENCOUNTER — TELEPHONE (OUTPATIENT)
Dept: FAMILY MEDICINE CLINIC | Age: 76
End: 2021-05-14

## 2021-05-14 ENCOUNTER — OFFICE VISIT (OUTPATIENT)
Dept: FAMILY MEDICINE CLINIC | Age: 76
End: 2021-05-14
Payer: MEDICARE

## 2021-05-14 VITALS
OXYGEN SATURATION: 98 % | DIASTOLIC BLOOD PRESSURE: 66 MMHG | BODY MASS INDEX: 19.46 KG/M2 | HEIGHT: 64 IN | WEIGHT: 114 LBS | HEART RATE: 76 BPM | SYSTOLIC BLOOD PRESSURE: 132 MMHG

## 2021-05-14 DIAGNOSIS — M54.31 SCIATICA OF RIGHT SIDE: Primary | ICD-10-CM

## 2021-05-14 PROCEDURE — 99213 OFFICE O/P EST LOW 20 MIN: CPT | Performed by: PHYSICIAN ASSISTANT

## 2021-05-14 RX ORDER — METHYLPREDNISOLONE 4 MG/1
TABLET ORAL
Qty: 1 KIT | Refills: 0 | Status: SHIPPED | OUTPATIENT
Start: 2021-05-14 | End: 2021-05-20

## 2021-05-14 RX ORDER — TIZANIDINE 2 MG/1
2 TABLET ORAL NIGHTLY PRN
Qty: 30 TABLET | Refills: 0 | Status: SHIPPED | OUTPATIENT
Start: 2021-05-14 | End: 2021-06-08 | Stop reason: SDUPTHER

## 2021-05-14 ASSESSMENT — ENCOUNTER SYMPTOMS
BACK PAIN: 1
RESPIRATORY NEGATIVE: 1

## 2021-05-14 NOTE — PROGRESS NOTES
Subjective:      Patient ID: Shirin Long is a 76 y.o. female. HPI  Patient presents with right sided sciatica for the last 2 weeks. Has had it twice in the past. Has been working more in the yard than normal so back is bothering her more. It is better with sitting. Worse when lying flat. Walking it is noticeable as the day goes on. The pain radiates to the foot with tingling. Review of Systems   Constitutional: Negative. Respiratory: Negative. Cardiovascular: Negative. Musculoskeletal: Positive for back pain. Right leg pain       Objective:   Physical Exam  Constitutional:       Appearance: Normal appearance. Cardiovascular:      Rate and Rhythm: Normal rate and regular rhythm. Heart sounds: Normal heart sounds. Pulmonary:      Effort: Pulmonary effort is normal.      Breath sounds: Normal breath sounds. Musculoskeletal:      Lumbar back: She exhibits pain. She exhibits normal range of motion. Back:    Neurological:      General: No focal deficit present. Mental Status: She is alert and oriented to person, place, and time. Assessment / Plan:       Diagnosis Orders   1. Sciatica of right side       See orders. Patient is to call if no improvement or signs and symptoms worsen.

## 2021-05-14 NOTE — TELEPHONE ENCOUNTER
Patient said her sciatica is flaring up again and has been bothering her a lot since last week. She thinks she overdid it doing yard work. She has pain down her leg and calf and her feet are tingling. Gets worse when she walks. She said she is going out of town next week and would like to get an xray. I told her that we may need office notes to order the xray and she expressed no interest in coming in for an appointment since she was just seen in March and discussed her sciatica with Dr. Valente Uriarte then. I did advise Dr. Valente Uriarte is out of the office. Can xray be ordered or what else should patient do?

## 2021-05-19 ENCOUNTER — TELEPHONE (OUTPATIENT)
Dept: FAMILY MEDICINE CLINIC | Age: 76
End: 2021-05-19

## 2021-05-19 DIAGNOSIS — M54.31 SCIATICA OF RIGHT SIDE: Primary | ICD-10-CM

## 2021-05-26 ENCOUNTER — TELEPHONE (OUTPATIENT)
Dept: ORTHOPEDIC SURGERY | Age: 76
End: 2021-05-26

## 2021-05-26 ENCOUNTER — OFFICE VISIT (OUTPATIENT)
Dept: ORTHOPEDIC SURGERY | Age: 76
End: 2021-05-26
Payer: MEDICARE

## 2021-05-26 VITALS — WEIGHT: 112 LBS | BODY MASS INDEX: 19.84 KG/M2 | HEIGHT: 63 IN

## 2021-05-26 DIAGNOSIS — G89.29 CHRONIC RIGHT-SIDED LOW BACK PAIN WITH RIGHT-SIDED SCIATICA: ICD-10-CM

## 2021-05-26 DIAGNOSIS — M54.41 CHRONIC RIGHT-SIDED LOW BACK PAIN WITH RIGHT-SIDED SCIATICA: ICD-10-CM

## 2021-05-26 DIAGNOSIS — M43.16 SPONDYLOLISTHESIS OF LUMBAR REGION: ICD-10-CM

## 2021-05-26 DIAGNOSIS — M51.36 DDD (DEGENERATIVE DISC DISEASE), LUMBAR: ICD-10-CM

## 2021-05-26 DIAGNOSIS — M54.16 LUMBAR RADICULOPATHY: Primary | ICD-10-CM

## 2021-05-26 PROCEDURE — 99214 OFFICE O/P EST MOD 30 MIN: CPT | Performed by: STUDENT IN AN ORGANIZED HEALTH CARE EDUCATION/TRAINING PROGRAM

## 2021-05-26 NOTE — PROGRESS NOTES
New Patient: LUMBAR SPINE    Referring Provider: Priya Westfall MD    CHIEF COMPLAINT:    Chief Complaint   Patient presents with    Lower Back Pain     one year history of lower back pain that radiates down right leg with numbness in foot. She had therapy and muscle relaxers. This episode of back pain has been for the past month. Took oral steroids with no relief. HISTORY OF PRESENT ILLNESS:    Ms. Clinton Randolph  is a pleasant 76 y.o. female here for consultation regarding her LBP and right leg pain. She states her pain began without injury about 1 year ago. The patient had physical therapy and muscle relaxants one year ago which helped resolve her pain until pain returned one month ago. Her pain has steadily worsened since then. She rates her back pain 5/10 and leg pain 5/10. She describes the pain as aching that is worse with walking and standing and better with sitting. The leg pain radiates down the posterior lateral aspect of her leg to her buttock, hamstring and lateral calf. She reports numbness and tingling in her right foot. She denies weakness of her right leg and denies bowel or bladder dysfunction. She states she can sit for a maximum of 15 minutes and stand for a maximum 5-10 minutes. The pain sometimes disrupts her sleep. Cannot stand straight up when transitioning to standing. Bridge PT exercise is tough to get to full extension. She has continued her home exercise program since it was prescribed last year.      Current/Past Treatment:   · Physical Therapy: yes with continued HEP  · Chiropractic:  none   · Injection:  none   · Medications:    · NSAIDS: Ibuprofen   · Muscle relaxer:  Zanaflex - good relief   · Steriods:  Prednisone - no relief   · Neuropathic medications:  NONE  · Opioids: NONE  · Other: NONE  ·  Surgery/Consult NONE    Past Medical History:   Past Medical History:   Diagnosis Date    Hypothyroidism       Past Surgical History:     Past Surgical History:   Procedure Laterality Date    DILATION AND CURETTAGE OF UTERUS      HYSTERECTOMY      TONSILLECTOMY       Current Medications:     Current Outpatient Medications:     tiZANidine (ZANAFLEX) 2 MG tablet, Take 1 tablet by mouth nightly as needed (muscle spasms), Disp: 30 tablet, Rfl: 0    levothyroxine (SYNTHROID) 75 MCG tablet, TAKE 1 TABLET BY MOUTH EVERY DAY, Disp: 90 tablet, Rfl: 1    ibuprofen (ADVIL;MOTRIN) 600 MG tablet, Take 600 mg by mouth every 6 hours as needed for Pain, Disp: , Rfl:   Allergies:  Patient has no known allergies. Social History:    reports that she has never smoked. She has never used smokeless tobacco. She reports current alcohol use. She reports that she does not use drugs. Family History:   Family History   Problem Relation Age of Onset   Chelo Hall Cancer Mother         breast    Diabetes Father     Heart Disease Father         chf       REVIEW OF SYSTEMS: Full ROS noted & scanned   CONSTITUTIONAL: Denies unexplained weight loss, fevers, chills or fatigue  NEUROLOGICAL: Denies unsteady gait or progressive weakness  MUSCULOSKELETAL: Denies joint swelling or redness  PSYCHOLOGICAL: Denies anxiety, depression   SKIN: Denies skin changes, delayed healing, rash, itching   HEMATOLOGIC: Denies easy bleeding or bruising  ENDOCRINE: Denies excessive thirst, urination, heat/cold  RESPIRATORY: Denies current dyspnea, cough  GI: Denies nausea, vomiting, diarrhea   : Denies bowel or bladder issues      PHYSICAL EXAM:    Vitals: Height 5' 2.5\" (1.588 m), weight 112 lb (50.8 kg), not currently breastfeeding. GENERAL EXAM:  · General Apparence: Patient is adequately groomed with no evidence of malnutrition. · Orientation: The patient is oriented to time, place and person. · Mood & Affect:The patient's mood and affect are appropriate. · Vascular: Examination reveals no swelling tenderness in upper or lower extremities. Good capillary refill.   · Lymphatic: The lymphatic examination bilaterally reveals all areas to be without enlargement or induration  · Sensation: Sensation is intact without deficit  · Coordination/Balance: Good coordination     CERVICAL EXAMINATION:  · Inspection: Local inspection shows no step-off or bruising. Cervical alignment is normal.     · Palpation: No evidence of tenderness at the midline, and trapezius. Paraspinal tenderness is not present. There is no step-off or paraspinal spasm. · Range of Motion: Range of Motion:  pain-free ROM   · Strength: 5/5 bilateral upper extremities   · Special Tests:    ·   Spurling's, L'Hermitte's & Arguelles's negative bilaterally. ·   Walker and Impingement tests are negative bilaterally. ·  Cubital and Carpal tunnel Tinel's negative bilaterally. · Skin:There are no rashes, ulcerations or lesions in right & left upper extremities. · Reflexes: Bilaterally triceps, biceps and brachioradialis are 2+. Clonus absent bilaterally at the feet. · Additional Examinations:       · RIGHT UPPER EXTREMITY:  Inspection/examination of the right upper extremity does not show any tenderness, deformity or injury. Range of motion is full. There is no gross instability. There are no rashes, ulcerations or lesions. Strength and tone are normal.  · LEFT UPPER EXTREMITY: Inspection/examination of the left upper extremity does not show any tenderness, deformity or injury. Range of motion is full. There is no gross instability. There are no rashes, ulcerations or lesions. Strength and tone are normal.    LUMBAR/SACRAL EXAMINATION:  · Inspection: Local inspection shows no step-off or bruising. Lumbar alignment is normal.  Sagittal and Coronal balance is neutral.      · Palpation:   No evidence of tenderness at the midline. No tenderness bilaterally at the paraspinal or trochanters. There is no step-off or paraspinal spasm.    · Range of Motion: limited by 25% in all planes due to pain worse with extension  · Hip ER and IR are pain free and within normal limits. · Strength:   Strength testing is 5/5 in all muscle groups tested. · Special Tests:   Straight leg raise positive right and crossed SLR negative. Slump test negative, Leg length and pelvis level. · Skin: There are no rashes, ulcerations or lesions. · Reflexes: Reflexes are symmetrically 2+ at the patellar and ankle tendons. Clonus absent bilaterally at the feet. · Gait & station: normal, patient ambulates without assistance  · Additional Examinations:   · RIGHT LOWER EXTREMITY: Inspection/examination of the right lower extremity does not show any tenderness, deformity or injury. Range of motion is unremarkable. There is no gross instability. There are no rashes, ulcerations or lesions. Strength and tone are normal.  · LEFT LOWER EXTREMITY:  Inspection/examination of the left lower extremity does not show any tenderness, deformity or injury. Range of motion is unremarkable. There is no gross instability. There are no rashes, ulcerations or lesions. Strength and tone are normal.    Diagnostic Testing:    Reviewed AP and lateral spine films taken today in the office: 5 lumbar type vertebrae, slight levo scoliosis of the lumbar spine, no fracture or abnormality, retrolisthesis L5, moderate to severe degenerative disc disease L4-L5 and L5-S1    Impression:    Diagnosis Orders   1. Lumbar radiculopathy  MRI LUMBAR SPINE WO CONTRAST   2. Chronic right-sided low back pain with right-sided sciatica     3. Spondylolisthesis of lumbar region  MRI LUMBAR SPINE WO CONTRAST   4. DDD (degenerative disc disease), lumbar  MRI LUMBAR SPINE WO CONTRAST         Plan:    Above diagnoses are Worsening    1. Medications: Continue anti-inflammatories with appropriate GI Precautions including to stop if develop dark tarry stools or GI upset and to take with food. 2. PT:  Encouraged to continue with HEP.     3. Further studies:  Setup Lumbar MR without contrast to evaluate for soft tissue pathology or stenosis contributing to the back pain and paresthesia. The patient has failed a six week trial of a HEP program within the last 6 months. 4. Interventional:  After further imaging is obtained, interventional options will be reviewed and recommended.       Magan Nieto PA-C  Board Certified by the [employer] on Certification of [facility]  Partner of [facility] ([facility])

## 2021-05-26 NOTE — LETTER
Bridging   1. Lie on your back with both knees bent. Your knees should be bent about 90 degrees. 2. Then push your feet into the floor, squeeze your buttocks, and lift your hips off the floor until your shoulders, hips, and knees are all in a straight line. 3. Hold for about 6 seconds as you continue to breathe normally, and then slowly lower your hips back down to the floor and rest for up to 10 seconds. 4. Repeat 8 to 12 times. Single-leg bridge   1. Lie on your back, with your arms at your sides. 2. Bend one knee, and keep that foot flat on the floor. The other leg should be straight. 3. Raise the straight leg up so that the knee is level with the bent knee. 4. Tighten your belly muscles by pulling your belly button in toward your spine. Lift your buttocks up and be careful not to let your hips drop down. 5. Hold for about 6 seconds as you continue to breathe normally, and then slowly lower your hips back down to the floor. 6. Switch legs and repeat steps 1 though 5.  7. Repeat 8 to 12 times.

## 2021-06-01 ENCOUNTER — TELEPHONE (OUTPATIENT)
Dept: ORTHOPEDIC SURGERY | Age: 76
End: 2021-06-01

## 2021-06-01 NOTE — TELEPHONE ENCOUNTER
Patient notes she is having bathroom issue with standing for long periods of time feeling like she can not hold the contents of her rectum. Patient contacted regarding Lumbar MRI W/O CONTRAST approval and authorization. Patient was notified and instructed to call to schedule their Lumbar MRI W/O CONTRAST at [address] [place] [address]. [address] [place] [address].[address] P: [phone]    Once completed, patient was instructed on scheduling a follow up appoint to review results. The patient has elected to contact the office at a later time to schedule a follow up appointment.

## 2021-06-01 NOTE — TELEPHONE ENCOUNTER
General Question     Subject: QUESTIONS REGARDING BATHROOM ISSUES  Patient and /or Facility Request: PATIENT  Contact Number: [phone]

## 2021-06-05 ENCOUNTER — HOSPITAL ENCOUNTER (OUTPATIENT)
Dept: MRI IMAGING | Age: 76
Discharge: HOME | End: 2021-06-05
Payer: MEDICARE

## 2021-06-05 DIAGNOSIS — M51.36 DDD (DEGENERATIVE DISC DISEASE), LUMBAR: ICD-10-CM

## 2021-06-05 DIAGNOSIS — M54.16 LUMBAR RADICULOPATHY: ICD-10-CM

## 2021-06-05 DIAGNOSIS — M43.16 SPONDYLOLISTHESIS OF LUMBAR REGION: ICD-10-CM

## 2021-06-05 PROCEDURE — 72148 MRI LUMBAR SPINE W/O DYE: CPT

## 2021-06-07 ENCOUNTER — TELEPHONE (OUTPATIENT)
Dept: ORTHOPEDIC SURGERY | Age: 76
End: 2021-06-07

## 2021-06-07 NOTE — TELEPHONE ENCOUNTER
SW patient in regards to scheduling a follow up visit for patients MRI LUMBAR  test results. Results will not be given over the phone and require an office visit for discussion.      PATIENT SCHEDULED 6/8/2021 8:45A

## 2021-06-07 NOTE — TELEPHONE ENCOUNTER
Other Patient is requesting mri results will be leaving to run errands and would like a call back on her cell phone.  ph [phone]

## 2021-06-08 ENCOUNTER — OFFICE VISIT (OUTPATIENT)
Dept: ORTHOPEDIC SURGERY | Age: 76
End: 2021-06-08
Payer: MEDICARE

## 2021-06-08 VITALS — BODY MASS INDEX: 19.84 KG/M2 | WEIGHT: 112 LBS | HEIGHT: 63 IN

## 2021-06-08 DIAGNOSIS — M51.26 HNP (HERNIATED NUCLEUS PULPOSUS), LUMBAR: ICD-10-CM

## 2021-06-08 DIAGNOSIS — M43.16 SPONDYLOLISTHESIS OF LUMBAR REGION: ICD-10-CM

## 2021-06-08 DIAGNOSIS — M54.16 LUMBAR RADICULOPATHY: Primary | ICD-10-CM

## 2021-06-08 PROCEDURE — 99213 OFFICE O/P EST LOW 20 MIN: CPT | Performed by: STUDENT IN AN ORGANIZED HEALTH CARE EDUCATION/TRAINING PROGRAM

## 2021-06-08 RX ORDER — TIZANIDINE 2 MG/1
2 TABLET ORAL NIGHTLY PRN
Qty: 30 TABLET | Refills: 0 | Status: SHIPPED | OUTPATIENT
Start: 2021-06-08 | End: 2021-07-02

## 2021-06-08 NOTE — PROGRESS NOTES
Follow up: [address] [place] [address]  1945  [id number]      CHIEF COMPLAINT:    Chief Complaint   Patient presents with    Lower Back Pain     FU MRI TR LSP, patient notes increase in pain due witha activity. HISTORY OF PRESENT ILLNESS:  Ms. Katie Taylor is a 76 y.o. female returns for a follow up visit for multiple medical problems. Her current presenting problems are   1. Lumbar radiculopathy    2. HNP (herniated nucleus pulposus), lumbar    3. Spondylolisthesis of lumbar region    . As per information/history obtained from the PADT(patient assessment and documentation tool) - She complains of pain in the buttocks with radiation to the upper leg Right, lower right leg, and right foot. She rates the pain 5/10 and describes it as throbbing. Pain is made worse by: walking, standing, sitting, bending over. She denies side effects from the current pain regimen. Patient reports that since the last follow up visit the physical functioning is worse, family/social relationships are unchanged, mood is unchanged and sleep patterns are unchanged, and that the overall functioning is unchanged. Patient denies neurological bowel or bladder. Ms. Katie Taylor is a 70-year-old female who presents today for follow-up of LBP and right leg pain. Pain shoots down into right buttock, posterior right leg, into calf, and foot. There is pain, numbness, tingling along L5-S1 distribution. At last visit patient was prescribed MDP and tizanidine. Patient completed course of MDP without symptom improvement. She has been using tizanidine at night to help with sleep. Patient received MRI on 6/5/2021. Patient leaves for vacation on 6/12/21 and is hoping to get an epidural injection before then.        Associated signs and symptoms:   Neurogenic bowel or bladder symptoms:  no   Perceived weakness:  Yes   Difficulty walking:  no              Past Medical History:   Past Medical History:   Diagnosis Date    Hypothyroidism       Past Surgical History:     Past Surgical History:   Procedure Laterality Date    DILATION AND CURETTAGE OF UTERUS      HYSTERECTOMY      TONSILLECTOMY       Current Medications:     Current Outpatient Medications:     tiZANidine (ZANAFLEX) 2 MG tablet, Take 1 tablet by mouth nightly as needed (muscle spasms), Disp: 30 tablet, Rfl: 0    levothyroxine (SYNTHROID) 75 MCG tablet, TAKE 1 TABLET BY MOUTH EVERY DAY, Disp: 90 tablet, Rfl: 1    ibuprofen (ADVIL;MOTRIN) 600 MG tablet, Take 600 mg by mouth every 6 hours as needed for Pain, Disp: , Rfl:   Allergies:  Patient has no known allergies. Social History:    reports that she has never smoked. She has never used smokeless tobacco. She reports current alcohol use. She reports that she does not use drugs. Family History:   Family History   Problem Relation Age of Onset   [facility] Cancer Mother         breast    Diabetes Father     Heart Disease Father         chf       REVIEW OF SYSTEMS:   CONSTITUTIONAL: Denies unexplained weight loss, fevers, chills or fatigue  NEUROLOGICAL: Denies unsteady gait or progressive weakness  MUSCULOSKELETAL: Denies joint swelling or redness  GI: Denies nausea, vomiting, diarrhea   : Denies bowel or bladder issues       PHYSICAL EXAM:    Vitals: Height 5' 2.5\" (1.588 m), weight 112 lb (50.8 kg), not currently breastfeeding. GENERAL EXAM:  · General Apparence: Patient is adequately groomed with no evidence of malnutrition. · Psychiatric: Orientation: The patient is oriented to time, place and person. The patient's mood and affect are appropriate   · Vascular: Examination reveals no swelling and palpation reveals no tenderness in upper or lower extremities. Good capillary refill.    · The lymphatic examination of the neck, axillae and groin reveals all areas to be without enlargement or induration  · Sensation is intact without deficit in the upper and lower extremities to light touch and pinprick  · Coordination of the upper and lower extremities are normal.  · RIGHT UPPER EXTREMITY:  Inspection/examination of the right upper extremity does not show any tenderness, deformity or injury. Range of motion is unremarkable and pain-free. There is no gross instability. There are no rashes, ulcerations or lesions. Strength and tone are normal. No atrophy or abnormal movements are noted. · LEFT UPPER EXTREMITY: Inspection/examination of the left upper extremity does not show any tenderness, deformity or injury. Range of motion is unremarkable and pain-free. There is no gross instability. There are no rashes, ulcerations or lesions. Strength and tone are normal. No atrophy or abnormal movements are noted. LUMBAR/SACRAL EXAMINATION:  · Inspection: Local inspection shows no step-off or bruising. Lumbar alignment is normal. No instability is noted. · Palpation:   No evidence of tenderness at the midline. Lumbar paraspinal tenderness: Mild L4/5 and L5/S1 tenderness  Bursal tenderness No tenderness bilaterally  There is no paraspinal spasm. · Range of Motion: limited by 25% in all planes due to pain  · Strength:   Strength testing is 5/5 in all muscle groups tested. · Special Tests:   Straight leg raise positive right and crossed SLR negative. Mick's testing is negative bilaterally. FADIR's testing is negative bilaterally. · Skin: There are no rashes, ulcerations or lesions. · Reflexes: Reflexes are symmetrically 2+ at the patellar and ankle tendons. Clonus absent bilaterally at the feet. · Gait & station: normal, patient ambulates without assistance  · Additional Examinations:  · RIGHT LOWER EXTREMITY: Inspection/examination of the right lower extremity does not show any tenderness, deformity or injury. Range of motion is normal and pain-free. There is no gross instability. There are no rashes, ulcerations or lesions. Strength and tone are normal. No atrophy or abnormal movements are noted.   · LEFT LOWER EXTREMITY:  Inspection/examination of the left lower extremity does not show any tenderness, deformity or injury. Range of motion is normal and pain-free. There is no gross instability. There are no rashes, ulcerations or lesions. Strength and tone are normal. No atrophy or abnormal movements are noted. Diagnostic Testing:    MR Lumbar spine shows    1. Thoracolumbar degenerative spondylosis as detailed in body report     2. T12-L1 severe right foraminal stenosis   3. L5-S1 right central/subarticular disc protrusion impinging right S1 nerve root   4.  Additional levels of foraminal stenosis as detailed in body report       Results for orders placed or performed in visit on 03/17/21   TSH without Reflex   Result Value Ref Range    TSH 0.28 0.27 - 4.20 uIU/mL   Lipid Panel   Result Value Ref Range    Cholesterol, Total 199 0 - 199 mg/dL    Triglycerides 72 0 - 150 mg/dL    HDL 80 (H) 40 - 60 mg/dL    LDL Calculated 105 (H) <100 mg/dL    VLDL Cholesterol Calculated 14 Not Established mg/dL   Hepatic Function Panel   Result Value Ref Range    Total Protein 7.0 6.4 - 8.2 g/dL    Albumin 4.3 3.4 - 5.0 g/dL    Alkaline Phosphatase 51 40 - 129 U/L    ALT 21 10 - 40 U/L    AST 20 15 - 37 U/L    Total Bilirubin 0.7 0.0 - 1.0 mg/dL    Bilirubin, Direct <0.2 0.0 - 0.3 mg/dL    Bilirubin, Indirect see below 0.0 - 1.0 mg/dL   Basic Metabolic Panel   Result Value Ref Range    Sodium 134 (L) 136 - 145 mmol/L    Potassium 3.9 3.5 - 5.1 mmol/L    Chloride 99 99 - 110 mmol/L    CO2 28 21 - 32 mmol/L    Anion Gap 7 3 - 16    Glucose 95 70 - 99 mg/dL    BUN 12 7 - 20 mg/dL    CREATININE 0.5 (L) 0.6 - 1.2 mg/dL    GFR Non-African American >60 >60    GFR African American >60 >60    Calcium 8.9 8.3 - 10.6 mg/dL   CBC Auto Differential   Result Value Ref Range    WBC 4.9 4.0 - 11.0 K/uL    RBC 4.22 4.00 - 5.20 M/uL    Hemoglobin 14.2 12.0 - 16.0 g/dL    Hematocrit 41.7 36.0 - 48.0 %    MCV 98.8 80.0 - 100.0 fL    MCH 33.7 26.0 - 34.0 pg    MCHC 34.1 31.0 - 36.0 g/dL    RDW scheduled accordingly with Dr. Gilmer Lopes. 5. Follow up:  4-6 weeks      Maura Elder was instructed to call the office if her symptoms worsen or if new symptoms appear prior to the next scheduled visit. She is specifically instructed to contact the office between now & her scheduled appointment if she has concerns related to her condition or if she needs assistance in scheduling the above tests. She is welcome to call for an appointment sooner if she has any additional concerns or questions. Paul SOLITARIO, personally performed the services described in this documentation as scribed by SREEDHAR Adams, in my presence and it is both accurate and complete. Can Dougherty PA-C   Board Certified by the [employer] on Certification of Physician Assistants  Bonifacio Jj 58  Partner of [facility] ([facility])             This dictation was performed with a verbal recognition program [facility] ) and it was checked for errors. It is possible that there are still dictated errors within this office note. If so, please bring any errors to my attention for an addendum. All efforts were made to ensure that this office note is accurate.

## 2021-06-09 ENCOUNTER — TELEPHONE (OUTPATIENT)
Dept: ORTHOPEDIC SURGERY | Age: 76
End: 2021-06-09

## 2021-06-09 NOTE — TELEPHONE ENCOUNTER
Other Patient received voicemail and wanted to know if the office can put in a prior auth for injection so it may be approved by the time she comes back.  ph [phone]

## 2021-06-09 NOTE — TELEPHONE ENCOUNTER
General Question     Subject: STATUS CHECK ON APPROVE INJ   Patient and /or Facility Request: PATIENT   Contact Number: [phone]

## 2021-06-09 NOTE — TELEPHONE ENCOUNTER
VM patient contacted regarding approval for her insurance. Approval may take up to a few days, even with the order placed as URGENT. This is due to [employer] and waiting on the authorization. The patient is going out of town on Saturday. We are unsure if it is possible to get the injection done before then. The number to call would be the facility doing the injection.      [facility], [address] [place] [address], [address] [place] [address]   [phone]

## 2021-06-21 ENCOUNTER — TELEPHONE (OUTPATIENT)
Dept: ORTHOPEDIC SURGERY | Age: 76
End: 2021-06-21

## 2021-06-21 NOTE — TELEPHONE ENCOUNTER
General Question     Subject: Patient called stating that her referral needs to be re faxed to Dr. Don Parish office.    Patient and /or Facility Request: Luiz Garcia Number: [phone]

## 2021-06-21 NOTE — TELEPHONE ENCOUNTER
We are sorry to hear you have not received a call on your referral for your procedure/injection. Please know we have faxed your order, demographics, insurance card, office note, and imaging to the physicians facility. Their office will submit the order for prior authorization. As a reminder, insurance companies can take up to 14 days to approve/deny your procedure/injection. For updates regarding the status of your procedure/injection, please contact the provider listed below:    [facility] , [address] [facility], [address] [place] [address]  [phone]    We appreciate your patience during this process.        Sachin Gaytan   Staff member of KATHY Barillas PA-C

## 2021-07-01 ENCOUNTER — HOSPITAL ENCOUNTER (OUTPATIENT)
Dept: INTERVENTIONAL RADIOLOGY/VASCULAR | Age: 76
Discharge: HOME | End: 2021-07-01
Payer: MEDICARE

## 2021-07-01 DIAGNOSIS — M54.17 LUMBOSACRAL RADICULOPATHY: ICD-10-CM

## 2021-07-01 DIAGNOSIS — M54.16 LUMBAR RADICULOPATHY: ICD-10-CM

## 2021-07-01 DIAGNOSIS — M51.26 DISPLACEMENT OF LUMBAR INTERVERTEBRAL DISC WITHOUT MYELOPATHY: ICD-10-CM

## 2021-07-01 PROCEDURE — 64483 NJX AA&/STRD TFRM EPI L/S 1: CPT

## 2021-07-01 PROCEDURE — 64484 NJX AA&/STRD TFRM EPI L/S EA: CPT

## 2021-07-01 PROCEDURE — 2709999900 HC NON-CHARGEABLE SUPPLY

## 2021-07-01 NOTE — OP NOTE
PRE-OP DIAGNOSIS: Lumbar Disc Displacement                                        Lumbar radiculopathy    POST-OP DIAGNOSIS: Same    PROCEDURE PERFORMED:  Right L5-S1 and S1 Transforaminal Epidural Steroid Injection under fluoroscopy. Anesthesia: Local.    COMPLICATIONS: None    ESTIMATED BLOOD LOSS: None    Indications & Consent: This patient has undergone the educational process involved with the procedure. The patient fully understands the risks involved with this procedure, potential damage to any and all body organs, including possible spinal anesthesia; bleeding; bruising; infection; headache; nerve injury; spinal injury; swelling; allergic reaction; hypotension; seizures; side effects of medicines; need for surgery; need for repeat procedures; hospital admission; increased or unalleviated pain; paralysis; pneumothorax; visceral injury; weakness; death. The patient understands that the potential for infection could result in abscess, resulting in the need for further surgery and prolonged medication prescriptions. The patient also understands that all sterile surgical techniques will be followed and the procedure will be undertaken in a safe, controlled and monitored setting. The patient is aware that the benefits include relief in pain, and reduction in use of oral medication, a return to a more functional lifestyle and a decrease in the social, psychological, emotional and physical impairment created by the pain syndrome. Alternatives were also discussed. The patient has previously failed conservative management including physical therapy, narcotic medications, nonsteroidal anti - inflammatory and adjunctive medications. A brief examination was conducted and the pertinent imaging studies and records were reviewed.  The patient verbalized understanding, was given an opportunity to ask questions which were answered to her satisfaction, and agreed to go ahead with the procedure    Description:  After appropriate preoperative workup and consent, the patient is positioned prone on the fluoroscopy table. The lower back was prepped with Betadine and draped with full aseptic precautions. The C-arm is positioned to give a PA view of the lumbar spine, and then rotated to a right oblique view such that a good Scottie dog appearance of the transverse process, superior articular facet and the pedicle at level L5 was viewed. A point is marked out on the skin corresponding to the 6 Oclock position on the pedicle. Buffered lidocaine 1% is infiltrated at this site. A 22 gauge 3.5 inch spinal needle with a bent tip is introduced through the skin wheal and directed to the 6 OClock position using tunnel vision. Upon bony contact the needle was walked off inferiorly and laterally and then turned back medially and advanced under lateral fluoroscopic control to have the tip of the needle touching the posterior aspect of the vertebral body at that level. They stylet is removed, no CSF or blood is aspirated. At this time the C-arm is rotated to give a crosstable lateral view at the selected level. 1.5 ml of Omnipaque 180 PF is injected under live fluoroscopy to rule out intravascular or intrathecal placement. Good spread of the contrast is seen under the pedicle, along the nerve root and into the anterior epidural space. There was no paresthesia during this injection. A total volume of 1.5 ml containing Marcaine 0.25% is injected which is followed by 7.5 mgs of Dexamethasone. Next the level S1 is treated in a similar manner with a similar volume of medication. Pictures are taken for records. The patient tolerated the procedure well. The needle is removed intact and the patient sent to recovery in a stable condition. The patient was cautioned about possible weakness / numbness of the lower extremity and told that it should be temporary if it occurs. Post Block Instructions:  1.  Ice Pack to area if local pain. 2. Regular diet. 3. If diabetic, monitor blood sugars. 4. Activity as tolerated. 5. Continue own medicines. 6. If local bleeding / swelling apply pressure and inform. 7. If persisting weakness call office or go to the ER. 8. Follow up as scheduled. 9. Patient is advised to call physician in the event of complications including headache, fever, increased backache, and weakness & bladder problems.

## 2021-07-02 DIAGNOSIS — M51.26 HNP (HERNIATED NUCLEUS PULPOSUS), LUMBAR: ICD-10-CM

## 2021-07-02 DIAGNOSIS — M54.16 LUMBAR RADICULOPATHY: ICD-10-CM

## 2021-07-02 DIAGNOSIS — M43.16 SPONDYLOLISTHESIS OF LUMBAR REGION: ICD-10-CM

## 2021-07-02 RX ORDER — TIZANIDINE 2 MG/1
2 TABLET ORAL NIGHTLY PRN
Qty: 30 TABLET | Refills: 0 | Status: SHIPPED | OUTPATIENT
Start: 2021-07-02 | End: 2021-07-15

## 2021-07-06 ENCOUNTER — TELEPHONE (OUTPATIENT)
Dept: ORTHOPEDIC SURGERY | Age: 76
End: 2021-07-06

## 2021-07-06 NOTE — TELEPHONE ENCOUNTER
MICKY pt and instructed it may take 10-14 days for the full benefits of the injection to take affect.

## 2021-07-06 NOTE — TELEPHONE ENCOUNTER
General Question     Subject: PT IS NOT GETTING ANY RELIEF FROM HER INJECTION. PLEASE RETURN HER CALL FOR QUESTIONS.   Patient and /or Facility RequestLorena Quiñonez  Contact Number: [phone]

## 2021-07-15 ENCOUNTER — TELEPHONE (OUTPATIENT)
Dept: ORTHOPEDIC SURGERY | Age: 76
End: 2021-07-15

## 2021-07-15 ENCOUNTER — OFFICE VISIT (OUTPATIENT)
Dept: ORTHOPEDIC SURGERY | Age: 76
End: 2021-07-15
Payer: MEDICARE

## 2021-07-15 VITALS — BODY MASS INDEX: 19.84 KG/M2 | HEIGHT: 63 IN | WEIGHT: 112 LBS

## 2021-07-15 DIAGNOSIS — M51.26 HNP (HERNIATED NUCLEUS PULPOSUS), LUMBAR: ICD-10-CM

## 2021-07-15 DIAGNOSIS — M54.16 LUMBAR RADICULOPATHY: Primary | ICD-10-CM

## 2021-07-15 DIAGNOSIS — M43.16 SPONDYLOLISTHESIS OF LUMBAR REGION: ICD-10-CM

## 2021-07-15 PROCEDURE — 99213 OFFICE O/P EST LOW 20 MIN: CPT | Performed by: STUDENT IN AN ORGANIZED HEALTH CARE EDUCATION/TRAINING PROGRAM

## 2021-07-15 NOTE — PROGRESS NOTES
Follow up: [address] [place] [address]  1945  [id number]      CHIEF COMPLAINT:    Chief Complaint   Patient presents with    Follow-up     FU LSP, no relief with injection on 7/1/2021         HISTORY OF PRESENT ILLNESS:  Ms. Jessa Willingham is a 76 y.o. female returns for a follow up visit for multiple medical problems. Her current presenting problems are   1. Lumbar radiculopathy    2. Spondylolisthesis of lumbar region    3. HNP (herniated nucleus pulposus), lumbar    . As per information/history obtained from the PADT(patient assessment and documentation tool) - She complains of pain in the lower back with radiation to the lower leg Right, ankles Right and feet Right She rates the pain 4/10 and describes it as aching, throbbing. Pain is made worse by: walking, standing, sitting. She denies side effects from the current pain regimen. Patient reports that since the last follow up visit the physical functioning is unchanged, family/social relationships are unchanged, mood is worse and sleep patterns are unchanged, and that the overall functioning is unchanged. Patient denies neurological bowel or bladder. Patient presents for follow-up of ongoing low back and right leg pain. She recently underwent a right L5 and S1 transforaminal epidural steroid injection on 7/1/2021. She did not get any relief following the injection. She continues to complain of low back pain with radiation to the hamstring, right calf and numbness and tingling and aching in the posterior ankle and Achilles region. Walking for prolonged periods, standing and sitting aggravate her symptoms. She has been doing physical therapy with her neighbor who is a physical therapist twice a week for the past few months. The patient states that she has been up for some time doing activity standing and sitting will become uncomfortable. She is able to stand for about 4 minutes and walk for 10 to 15 minutes without a considerable amount of pain.   Lying down helps alleviate her pain. Associated signs and symptoms:   Neurogenic bowel or bladder symptoms:  no   Perceived weakness:  no   Difficulty walking:  no              Past Medical History:   Past Medical History:   Diagnosis Date    Hypothyroidism       Past Surgical History:     Past Surgical History:   Procedure Laterality Date    DILATION AND CURETTAGE OF UTERUS      HYSTERECTOMY      TONSILLECTOMY       Current Medications:     Current Outpatient Medications:     levothyroxine (SYNTHROID) 75 MCG tablet, TAKE 1 TABLET BY MOUTH EVERY DAY, Disp: 90 tablet, Rfl: 1    ibuprofen (ADVIL;MOTRIN) 600 MG tablet, Take 600 mg by mouth every 6 hours as needed for Pain (Patient not taking: Reported on 7/15/2021), Disp: , Rfl:   Allergies:  Patient has no known allergies. Social History:    reports that she has never smoked. She has never used smokeless tobacco. She reports current alcohol use. She reports that she does not use drugs. Family History:   Family History   Problem Relation Age of Onset   Amber Solum Cancer Mother         breast    Diabetes Father     Heart Disease Father         chf       REVIEW OF SYSTEMS:   CONSTITUTIONAL: Denies unexplained weight loss, fevers, chills or fatigue  NEUROLOGICAL: Denies unsteady gait or progressive weakness  MUSCULOSKELETAL: Denies joint swelling or redness  GI: Denies nausea, vomiting, diarrhea   : Denies bowel or bladder issues       PHYSICAL EXAM:    Vitals: Height 5' 2.5\" (1.588 m), weight 112 lb (50.8 kg), not currently breastfeeding. GENERAL EXAM:  · General Apparence: Patient is adequately groomed with no evidence of malnutrition. · Psychiatric: Orientation: The patient is oriented to time, place and person. The patient's mood and affect are appropriate   · Vascular: Examination reveals no swelling and palpation reveals no tenderness in upper or lower extremities. Good capillary refill.    · The lymphatic examination of the neck, axillae and groin reveals all areas to be without enlargement or induration  · Sensation is intact without deficit in the upper and lower extremities to light touch and pinprick  · Coordination of the upper and lower extremities are normal.  · RIGHT UPPER EXTREMITY:  Inspection/examination of the right upper extremity does not show any tenderness, deformity or injury. Range of motion is unremarkable and pain-free. There is no gross instability. There are no rashes, ulcerations or lesions. Strength and tone are normal. No atrophy or abnormal movements are noted. · LEFT UPPER EXTREMITY: Inspection/examination of the left upper extremity does not show any tenderness, deformity or injury. Range of motion is unremarkable and pain-free. There is no gross instability. There are no rashes, ulcerations or lesions. Strength and tone are normal. No atrophy or abnormal movements are noted. LUMBAR/SACRAL EXAMINATION:  · Inspection: Local inspection shows no step-off or bruising. Lumbar alignment is normal. No instability is noted. · Palpation:   No evidence of tenderness at the midline. Lumbar paraspinal tenderness: Mild L4/5 and L5/S1 tenderness  Bursal tenderness No tenderness bilaterally  There is no paraspinal spasm. · Range of Motion: limited by 25% in all planes due to pain  · Strength:   Strength testing is 5/5 in all muscle groups tested. · Special Tests:   Straight leg raise positive right and crossed SLR negative. Mick's testing is negative bilaterally. FADIR's testing is negative bilaterally. · Skin: There are no rashes, ulcerations or lesions. · Reflexes: Reflexes are symmetrically 2+ at the patellar and ankle tendons. Clonus absent bilaterally at the feet. · Gait & station: normal, patient ambulates without assistance  · Additional Examinations:  · RIGHT LOWER EXTREMITY: Inspection/examination of the right lower extremity does not show any tenderness, deformity or injury. Range of motion is normal and pain-free.  There is no gross instability. There are no rashes, ulcerations or lesions. Strength and tone are normal. No atrophy or abnormal movements are noted. · LEFT LOWER EXTREMITY:  Inspection/examination of the left lower extremity does not show any tenderness, deformity or injury. Range of motion is normal and pain-free. There is no gross instability. There are no rashes, ulcerations or lesions. Strength and tone are normal. No atrophy or abnormal movements are noted. Diagnostic Testing:    MR Lumbar spine shows    T12-L1: Severe disc space narrowing. There is disc bulge superimposed on grade I retrolisthesis mildly compressing thecal sac greater on the right. Mild facet osteoarthritis. There is severe right foraminal stenosis and mild left foraminal stenosis.       L1-L2: Disc bulge mildly compresses thecal sac. Mild facet osteoarthritis, but no central stenosis. Mild left foraminal stenosis.       L2-L3: Disc bulge and moderate left facet osteoarthritis are present. Mild thecal sac compression, but no significant central stenosis. There is mild left foraminal stenosis.       L3-L4: Disc bulge and severe left greater than right facet osteoarthritis are present. There is mild bilateral foraminal stenosis. Mild thecal sac compression, but no significant central stenosis.       L4-L5: Degenerative disc height loss and grade I spondylolisthesis are present. Severe facet osteoarthritis. There is mild to moderate bilateral recess stenosis. Mild bilateral foraminal stenosis.       L5-S1: There is moderate degenerative disc height loss. A right central/subarticular disc protrusion impinges the right S1 nerve root.        Results for orders placed or performed in visit on 03/17/21   TSH without Reflex   Result Value Ref Range    TSH 0.28 0.27 - 4.20 uIU/mL   Lipid Panel   Result Value Ref Range    Cholesterol, Total 199 0 - 199 mg/dL    Triglycerides 72 0 - 150 mg/dL    HDL 80 (H) 40 - 60 mg/dL    LDL Calculated 105 (H) <100 mg/dL VLDL Cholesterol Calculated 14 Not Established mg/dL   Hepatic Function Panel   Result Value Ref Range    Total Protein 7.0 6.4 - 8.2 g/dL    Albumin 4.3 3.4 - 5.0 g/dL    Alkaline Phosphatase 51 40 - 129 U/L    ALT 21 10 - 40 U/L    AST 20 15 - 37 U/L    Total Bilirubin 0.7 0.0 - 1.0 mg/dL    Bilirubin, Direct <0.2 0.0 - 0.3 mg/dL    Bilirubin, Indirect see below 0.0 - 1.0 mg/dL   Basic Metabolic Panel   Result Value Ref Range    Sodium 134 (L) 136 - 145 mmol/L    Potassium 3.9 3.5 - 5.1 mmol/L    Chloride 99 99 - 110 mmol/L    CO2 28 21 - 32 mmol/L    Anion Gap 7 3 - 16    Glucose 95 70 - 99 mg/dL    BUN 12 7 - 20 mg/dL    CREATININE 0.5 (L) 0.6 - 1.2 mg/dL    GFR Non-African American >60 >60    GFR African American >60 >60    Calcium 8.9 8.3 - 10.6 mg/dL   CBC Auto Differential   Result Value Ref Range    WBC 4.9 4.0 - 11.0 K/uL    RBC 4.22 4.00 - 5.20 M/uL    Hemoglobin 14.2 12.0 - 16.0 g/dL    Hematocrit 41.7 36.0 - 48.0 %    MCV 98.8 80.0 - 100.0 fL    MCH 33.7 26.0 - 34.0 pg    MCHC 34.1 31.0 - 36.0 g/dL    RDW 13.5 12.4 - 15.4 %    Platelets 350 147 - 410 K/uL    MPV 8.2 5.0 - 10.5 fL    Neutrophils % 45.4 %    Lymphocytes % 43.1 %    Monocytes % 8.6 %    Eosinophils % 2.1 %    Basophils % 0.8 %    Neutrophils Absolute 2.2 1.7 - 7.7 K/uL    Lymphocytes Absolute 2.1 1.0 - 5.1 K/uL    Monocytes Absolute 0.4 0.0 - 1.3 K/uL    Eosinophils Absolute 0.1 0.0 - 0.6 K/uL    Basophils Absolute 0.0 0.0 - 0.2 K/uL     Impression:       1. Lumbar radiculopathy    2. Spondylolisthesis of lumbar region    3. HNP (herniated nucleus pulposus), lumbar        Plan:  Clinical Course: Above diagnoses are worsening    I discussed the diagnosis and the treatment options with Jessica Sanchez today. In Summary:  The various treatment options were outlined and discussed with Jessica Sanchez including:  Conservative care options: physical therapy, ice, medications, bracing, and activity modification.  The indications for therapeutic injections. The indications for additional imaging/laboratory studies. The indications for (possible future) interventions. After considering the various options discussed, Nikkie Turner elected to pursue a course of treatment that includes the followin. Medications:  Continue anti-inflammatories with appropriate GI Precautions including to stop if develop dark tarry stools or GI upset and to take with food. 2. PT:  Encouraged to continue with HEP. 3. Further studies:  No further studies. 4. Interventional:  We discussed pursuing a right L4 and L5 transforaminal epidural steroid injection to address the pain. Radiologic imaging and symptoms confirm the pain etiology. Risks, benefits and alternatives of interventional options were discussed. These include and are not limited to bleeding, infection, spinal headache, nerve injury and lack of pain relief. The patient verbalized understanding and would like to proceed. The patient will be scheduled accordingly. 5. Follow up:  2-3 weeks      Nikkie Turner was instructed to call the office if her symptoms worsen or if new symptoms appear prior to the next scheduled visit. She is specifically instructed to contact the office between now & her scheduled appointment if she has concerns related to her condition or if she needs assistance in scheduling the above tests. She is welcome to call for an appointment sooner if she has any additional concerns or questions. Jo-Ann Pena PA-C   Board Certified by the [employer] on Certification of [address] and Orthopa[facility]                This dictation was performed with a verbal recognition program [facility]) and it was checked for errors. It is possible that there are still dictated errors within this office note. If so, please bring any errors to my attention for an addendum.  All efforts were made to ensure that this office note is accurate.

## 2021-07-15 NOTE — TELEPHONE ENCOUNTER
Other Patient would like a call back regarding back injection. Patient states she is still having trouble and would like advice.   [phone]

## 2021-07-27 ENCOUNTER — TELEPHONE (OUTPATIENT)
Dept: FAMILY MEDICINE CLINIC | Age: 76
End: 2021-07-27

## 2021-07-27 NOTE — TELEPHONE ENCOUNTER
----- Message from Ada Segura sent at 7/27/2021  8:48 AM EDT -----  Subject: Message to Provider    QUESTIONS  Information for Provider? Sandi Marti called and advised that she heard that Dr Jaclyn Singh is retiring and would like a referral to a dr that is in [facility]   area. ---------------------------------------------------------------------------  --------------  Terri MONROY  What is the best way for the office to contact you? OK to leave message on   voicemail  Preferred Call Back Phone Number?  [phone]  ---------------------------------------------------------------------------  --------------  SCRIPT ANSWERS  undefined

## 2021-07-27 NOTE — TELEPHONE ENCOUNTER
Will be witting letter and giving referal in sept or Oct. Need to find who is available.   Dr Stephen Bonilla

## 2021-07-29 ENCOUNTER — TELEPHONE (OUTPATIENT)
Dept: ORTHOPEDIC SURGERY | Age: 76
End: 2021-07-29

## 2021-07-29 NOTE — TELEPHONE ENCOUNTER
Spoke with patient regarding plan of care. Her insurance denied a repeat transforaminal epidural steroid injection. I spoke with Dr. Charley Forte last night and we agree to pursue a different approach through an interlaminar epidural steroid injection. His office will contact the patient to schedule.

## 2021-08-02 DIAGNOSIS — M51.26 HNP (HERNIATED NUCLEUS PULPOSUS), LUMBAR: ICD-10-CM

## 2021-08-02 DIAGNOSIS — M54.16 LUMBAR RADICULOPATHY: ICD-10-CM

## 2021-08-02 DIAGNOSIS — M43.16 SPONDYLOLISTHESIS OF LUMBAR REGION: ICD-10-CM

## 2021-08-02 RX ORDER — TIZANIDINE 2 MG/1
2 TABLET ORAL NIGHTLY PRN
Qty: 30 TABLET | Refills: 0 | Status: SHIPPED | OUTPATIENT
Start: 2021-08-02 | End: 2022-01-04 | Stop reason: ALTCHOICE

## 2021-08-02 NOTE — TELEPHONE ENCOUNTER
Patient last seen 7/15/2021 and medication last filled 2021:     Disp Refills Start End    tiZANidine (ZANAFLEX) 2 MG tablet (Discontinued) 30 tablet 0 2021 7/15/2021        Impression:         1. Lumbar radiculopathy    2. Spondylolisthesis of lumbar region    3. HNP (herniated nucleus pulposus), lumbar          Plan:  Clinical Course: Above diagnoses are worsening     I discussed the diagnosis and the treatment options with Cheryle Haywood today.      In Summary:  The various treatment options were outlined and discussed with Cheryle Haywood including:  Conservative care options: physical therapy, ice, medications, bracing, and activity modification. The indications for therapeutic injections. The indications for additional imaging/laboratory studies. The indications for (possible future) interventions.      After considering the various options discussed, Cheryle Haywood elected to pursue a course of treatment that includes the followin. Medications:  Continue anti-inflammatories with appropriate GI Precautions including to stop if develop dark tarry stools or GI upset and to take with food.     2. PT:  Encouraged to continue with HEP.     3. Further studies:  No further studies.     4. Interventional:  We discussed pursuing a right L4 and L5 transforaminal epidural steroid injection to address the pain. Radiologic imaging and symptoms confirm the pain etiology. Risks, benefits and alternatives of interventional options were discussed. These include and are not limited to bleeding, infection, spinal headache, nerve injury and lack of pain relief. The patient verbalized understanding and would like to proceed. The patient will be scheduled accordingly.     5.  Follow up:  2-3 weeks

## 2021-08-06 RX ORDER — LEVOTHYROXINE SODIUM 0.07 MG/1
TABLET ORAL
Qty: 90 TABLET | Refills: 1 | Status: SHIPPED | OUTPATIENT
Start: 2021-08-06 | End: 2022-02-07

## 2021-08-11 ENCOUNTER — TELEPHONE (OUTPATIENT)
Dept: ORTHOPEDIC SURGERY | Age: 76
End: 2021-08-11

## 2021-08-11 NOTE — TELEPHONE ENCOUNTER
We are sorry to hear you have not received a call on your referral for your procedure/injection. Please know we have faxed your order, demographics, insurance card, office note, and imaging to the physicians facility. Their office will submit the order for prior authorization. As a reminder, insurance companies can take up to 14 days to approve/deny your procedure/injection. For updates regarding the status of your procedure/injection, please contact the provider listed below:    Bard Frank, [address] [facility]  [address] , [place] [facility]  [phone]    We appreciate your patience during this process.        Khadar Ryan   Staff member of KATHY Barillas PA-C

## 2021-08-11 NOTE — TELEPHONE ENCOUNTER
General Question     Subject: CHECKING THE STATUS OF EPID INJ APPROVAL AND GETTING SCHD   Patient and /or Facility Request: PATIENT   Contact Number: [phone]

## 2021-09-02 ENCOUNTER — HOSPITAL ENCOUNTER (OUTPATIENT)
Dept: INTERVENTIONAL RADIOLOGY/VASCULAR | Age: 76
Discharge: HOME | End: 2021-09-02
Payer: MEDICARE

## 2021-09-02 DIAGNOSIS — M54.16 LUMBAR RADICULOPATHY: ICD-10-CM

## 2021-09-02 PROCEDURE — 2500000003 HC RX 250 WO HCPCS

## 2021-09-02 PROCEDURE — 62323 NJX INTERLAMINAR LMBR/SAC: CPT

## 2021-09-02 PROCEDURE — 6360000002 HC RX W HCPCS

## 2021-09-02 PROCEDURE — 6360000004 HC RX CONTRAST MEDICATION: Performed by: PAIN MEDICINE

## 2021-09-02 RX ADMIN — IOHEXOL 10 ML: 180 INJECTION INTRAVENOUS at 11:00

## 2021-09-02 NOTE — OP NOTE
PROCEDURE TITLE: Lumbar Epidural Steroid Injection under Fluoro      PREOPERATIVE DIAGNOSIS:    1. Lumbar disc displacement  2. Lumbar radiculopathy    POSTOPERATIVE DIAGNOSIS:  1. Same    PROCEDURE:  Lumbar epidural steroid injection L5-S1 under Fluroscopy. PROCEDURE NOTE:  After obtaining written informed consent patient was taken to the procedure room. Pre-procedure blood pressure and pulse were stable and recorded in patients clinic chart. The patient was placed in the prone position on fluoroscopy table. The lower back was prepped with antiseptic solution and draped in the usual sterile fashion. The skin over the L5-S1   was identified under fluoroscopic guidance and infiltrated with 3 ml of 1% Lidocaine for local anesthesia via 25 gauge needle. An 18-gauge Touhy needle, was used under fluoroscopic guidance to access the epidural space using loss of resistance to air technique. Approximately 3 ml of Omnipeque 180 contrast was injected under continuous fluoroscopic guidance and good spread was noted following a negative aspiration  Following negative aspiration, a mixture of 1 ml marcaine 0.25% and Kenalog 80 mg was injected with minimal pressure. There was no evidence of CSF, paresthesia or heme. The needle was cleared with preservative free local anesthetic and removed. Skin was cleaned and a sterile dressing was applied. Following the procedure the patient's vital signs were stable. The patient was discharged home in good condition after being given discharge instructions.

## 2022-01-04 ENCOUNTER — OFFICE VISIT (OUTPATIENT)
Dept: INTERNAL MEDICINE CLINIC | Age: 77
End: 2022-01-04
Payer: MEDICARE

## 2022-01-04 VITALS
HEIGHT: 62 IN | DIASTOLIC BLOOD PRESSURE: 74 MMHG | WEIGHT: 115 LBS | BODY MASS INDEX: 21.16 KG/M2 | SYSTOLIC BLOOD PRESSURE: 132 MMHG

## 2022-01-04 DIAGNOSIS — E03.9 ACQUIRED HYPOTHYROIDISM: Primary | ICD-10-CM

## 2022-01-04 DIAGNOSIS — E78.00 PURE HYPERCHOLESTEROLEMIA: ICD-10-CM

## 2022-01-04 DIAGNOSIS — M85.89 OSTEOPENIA OF MULTIPLE SITES: ICD-10-CM

## 2022-01-04 DIAGNOSIS — E55.9 VITAMIN D DEFICIENCY: ICD-10-CM

## 2022-01-04 PROBLEM — L08.9 INFECTED FINGER: Status: RESOLVED | Noted: 2017-06-23 | Resolved: 2022-01-04

## 2022-01-04 PROCEDURE — 99214 OFFICE O/P EST MOD 30 MIN: CPT | Performed by: INTERNAL MEDICINE

## 2022-01-04 RX ORDER — BIOTIN 1 MG
TABLET ORAL
COMMUNITY

## 2022-01-04 RX ORDER — ZINC GLUCONATE 50 MG
50 TABLET ORAL DAILY
COMMUNITY

## 2022-01-04 RX ORDER — ACETAMINOPHEN 160 MG
TABLET,DISINTEGRATING ORAL
COMMUNITY

## 2022-01-04 SDOH — ECONOMIC STABILITY: FOOD INSECURITY: WITHIN THE PAST 12 MONTHS, YOU WORRIED THAT YOUR FOOD WOULD RUN OUT BEFORE YOU GOT MONEY TO BUY MORE.: NEVER TRUE

## 2022-01-04 SDOH — ECONOMIC STABILITY: FOOD INSECURITY: WITHIN THE PAST 12 MONTHS, THE FOOD YOU BOUGHT JUST DIDN'T LAST AND YOU DIDN'T HAVE MONEY TO GET MORE.: NEVER TRUE

## 2022-01-04 ASSESSMENT — SOCIAL DETERMINANTS OF HEALTH (SDOH): HOW HARD IS IT FOR YOU TO PAY FOR THE VERY BASICS LIKE FOOD, HOUSING, MEDICAL CARE, AND HEATING?: NOT HARD AT ALL

## 2022-01-04 NOTE — PROGRESS NOTES
Chapin Beth (:  1945) is a 68 y.o. female,New patient, here for evaluation of the following chief complaint(s):  Establish Care         ASSESSMENT/PLAN:  1. Acquired hypothyroidism  -Stable, due for blood work  -Continue levothyroxine 75 mcg daily  -     Comprehensive Metabolic Panel; Future  -     TSH with Reflex; Future  -     CBC Auto Differential; Future  -     Lipid Panel; Future  2. Osteopenia of multiple sites   -Continue with calcium and vitamin D supplement  3. Vitamin D deficiency  -     Vitamin D 25 Hydroxy; Future  4. Pure hypercholesterolemia   -Monitor    Return in about 1 year (around 2023) for AWV. Subjective   SUBJECTIVE/OBJECTIVE:  HPI    Overall she has been healthy, she takes thyroxine for hypothyroidism. Denies weight gain, fatigue. She takes calcium and vitamin D due to osteopenia. She had a DEXA in 2017, has not had a repeat since she would not want to take medications for osteoporosis. Due for mammogram, will schedule this year    Review of Systems       Objective   Physical Exam  Vitals reviewed. Constitutional:       General: She is not in acute distress. Appearance: Normal appearance. She is well-developed. HENT:      Head: Normocephalic and atraumatic. Cardiovascular:      Rate and Rhythm: Normal rate and regular rhythm. Heart sounds: Normal heart sounds. Pulmonary:      Effort: Pulmonary effort is normal. No respiratory distress. Breath sounds: Normal breath sounds. Skin:     General: Skin is warm and dry. Neurological:      Mental Status: She is alert. Psychiatric:         Mood and Affect: Mood normal.         Behavior: Behavior normal.         Thought Content: Thought content normal.         Judgment: Judgment normal.                  An electronic signature was used to authenticate this note.     --Jerry Wislon MD

## 2022-02-07 RX ORDER — LEVOTHYROXINE SODIUM 0.07 MG/1
TABLET ORAL
Qty: 90 TABLET | Refills: 3 | Status: SHIPPED | OUTPATIENT
Start: 2022-02-07

## 2022-02-08 DIAGNOSIS — E55.9 VITAMIN D DEFICIENCY: ICD-10-CM

## 2022-02-08 DIAGNOSIS — E03.9 ACQUIRED HYPOTHYROIDISM: ICD-10-CM

## 2022-02-08 LAB
A/G RATIO: 1.6 (ref 1.1–2.2)
ALBUMIN SERPL-MCNC: 4.4 G/DL (ref 3.4–5)
ALP BLD-CCNC: 56 U/L (ref 40–129)
ALT SERPL-CCNC: 32 U/L (ref 10–40)
ANION GAP SERPL CALCULATED.3IONS-SCNC: 14 MMOL/L (ref 3–16)
AST SERPL-CCNC: 26 U/L (ref 15–37)
BASOPHILS ABSOLUTE: 0 K/UL (ref 0–0.2)
BASOPHILS RELATIVE PERCENT: 0.9 %
BILIRUB SERPL-MCNC: 0.5 MG/DL (ref 0–1)
BUN BLDV-MCNC: 14 MG/DL (ref 7–20)
CALCIUM SERPL-MCNC: 9.3 MG/DL (ref 8.3–10.6)
CHLORIDE BLD-SCNC: 100 MMOL/L (ref 99–110)
CHOLESTEROL, TOTAL: 193 MG/DL (ref 0–199)
CO2: 23 MMOL/L (ref 21–32)
CREAT SERPL-MCNC: <0.5 MG/DL (ref 0.6–1.2)
EOSINOPHILS ABSOLUTE: 0.1 K/UL (ref 0–0.6)
EOSINOPHILS RELATIVE PERCENT: 1.9 %
GFR AFRICAN AMERICAN: >60
GFR NON-AFRICAN AMERICAN: >60
GLUCOSE BLD-MCNC: 83 MG/DL (ref 70–99)
HCT VFR BLD CALC: 41.6 % (ref 36–48)
HDLC SERPL-MCNC: 89 MG/DL (ref 40–60)
HEMOGLOBIN: 14.1 G/DL (ref 12–16)
LDL CHOLESTEROL CALCULATED: 91 MG/DL
LYMPHOCYTES ABSOLUTE: 1.7 K/UL (ref 1–5.1)
LYMPHOCYTES RELATIVE PERCENT: 37.4 %
MCH RBC QN AUTO: 33.6 PG (ref 26–34)
MCHC RBC AUTO-ENTMCNC: 34 G/DL (ref 31–36)
MCV RBC AUTO: 99 FL (ref 80–100)
MONOCYTES ABSOLUTE: 0.4 K/UL (ref 0–1.3)
MONOCYTES RELATIVE PERCENT: 9.3 %
NEUTROPHILS ABSOLUTE: 2.3 K/UL (ref 1.7–7.7)
NEUTROPHILS RELATIVE PERCENT: 50.5 %
PDW BLD-RTO: 13.2 % (ref 12.4–15.4)
PLATELET # BLD: 265 K/UL (ref 135–450)
PMV BLD AUTO: 8 FL (ref 5–10.5)
POTASSIUM SERPL-SCNC: 4.2 MMOL/L (ref 3.5–5.1)
RBC # BLD: 4.2 M/UL (ref 4–5.2)
SODIUM BLD-SCNC: 137 MMOL/L (ref 136–145)
T4 FREE: 1.5 NG/DL (ref 0.9–1.8)
TOTAL PROTEIN: 7.1 G/DL (ref 6.4–8.2)
TRIGL SERPL-MCNC: 66 MG/DL (ref 0–150)
TSH REFLEX: 0.26 UIU/ML (ref 0.27–4.2)
VLDLC SERPL CALC-MCNC: 13 MG/DL
WBC # BLD: 4.6 K/UL (ref 4–11)

## 2022-02-09 LAB
T3 TOTAL: 0.81 NG/ML (ref 0.8–2)
VITAMIN D 25-HYDROXY: 26.8 NG/ML

## 2022-03-29 ENCOUNTER — TELEPHONE (OUTPATIENT)
Dept: INTERNAL MEDICINE CLINIC | Age: 77
End: 2022-03-29

## 2022-03-29 NOTE — TELEPHONE ENCOUNTER
Pt called and said [facility] will not put  on her insurance card because they have her listed as a NP and not a MD. Pt says [facility] has a doctor named QUINCY Yu listed and she doesn't know who that doctor is.         Please call and advise

## 2022-04-26 ENCOUNTER — TELEPHONE (OUTPATIENT)
Dept: INTERNAL MEDICINE CLINIC | Age: 77
End: 2022-04-26

## 2022-04-26 DIAGNOSIS — E03.9 ACQUIRED HYPOTHYROIDISM: Primary | ICD-10-CM

## 2022-04-27 DIAGNOSIS — E03.9 ACQUIRED HYPOTHYROIDISM: ICD-10-CM

## 2022-04-27 LAB
T3 FREE: 2.3 PG/ML (ref 2.3–4.2)
T4 FREE: 1.5 NG/DL (ref 0.9–1.8)
TSH REFLEX: 0.5 UIU/ML (ref 0.27–4.2)

## 2022-04-28 NOTE — TELEPHONE ENCOUNTER
sw At this time we are not able to refill the prescription as the patient was last seen July 2021. She is already scheduled for 5/9/2022 at 9:45 am at the [facility] office. We can fill a new script at this time.

## 2022-04-28 NOTE — TELEPHONE ENCOUNTER
Prescription Refill     Medication Name:  tizandine    Pharmacy: [facility] in [place]  Patient Contact Number:  [phone]    The patient call she need to have a new prescription for this medication and she would like to know how many times a day do she need to take this medication. Please Advise.

## 2022-05-03 ENCOUNTER — TELEPHONE (OUTPATIENT)
Dept: ORTHOPEDIC SURGERY | Age: 77
End: 2022-05-03

## 2022-05-03 NOTE — TELEPHONE ENCOUNTER
URVASHI/LEYLA LEBLANC  Advised to use Lidocaine patch over area of pain, no more than 2 patches at a time. Do not place patch over an open wound, watch for adverse reactions such as itching, redness, swelling, rash, red streaks, fever, etc. Do not leave patch on longer than indicated by package. Do not use patch if directed otherwise by a different provider. Suggested contacting PCP to ensure that the patch will not interfere with any prescribed medications from their office. Patient was instructed to contact the office with further questions or concerns.

## 2022-05-09 ENCOUNTER — OFFICE VISIT (OUTPATIENT)
Dept: ORTHOPEDIC SURGERY | Age: 77
End: 2022-05-09
Payer: MEDICARE

## 2022-05-09 VITALS — WEIGHT: 117.3 LBS | BODY MASS INDEX: 21.59 KG/M2 | HEIGHT: 62 IN

## 2022-05-09 DIAGNOSIS — M54.16 LUMBAR RADICULOPATHY: ICD-10-CM

## 2022-05-09 DIAGNOSIS — M43.16 SPONDYLOLISTHESIS OF LUMBAR REGION: ICD-10-CM

## 2022-05-09 DIAGNOSIS — M51.26 HNP (HERNIATED NUCLEUS PULPOSUS), LUMBAR: ICD-10-CM

## 2022-05-09 PROCEDURE — 99214 OFFICE O/P EST MOD 30 MIN: CPT | Performed by: STUDENT IN AN ORGANIZED HEALTH CARE EDUCATION/TRAINING PROGRAM

## 2022-05-09 RX ORDER — TIZANIDINE 2 MG/1
2 TABLET ORAL NIGHTLY PRN
Qty: 30 TABLET | Refills: 0 | Status: SHIPPED | OUTPATIENT
Start: 2022-05-09 | End: 2022-05-31

## 2022-05-09 NOTE — PROGRESS NOTES
Follow up: [address] [place] [address]  1945  [id number]      CHIEF COMPLAINT:    Chief Complaint   Patient presents with    Follow-up     fu lumbar         HISTORY OF PRESENT ILLNESS:  Ms. Karsten Hernandez is a 68 y.o. female returns for a follow up visit for multiple medical problems. Her current presenting problems are   1. Lumbar radiculopathy    2. Spondylolisthesis of lumbar region    3. HNP (herniated nucleus pulposus), lumbar    . As per information/history obtained from the PADT(patient assessment and documentation tool) - She complains of pain in the lower back with radiation to the lower leg Right and feet Right She rates the pain 6/10 and describes it as aching, numbness, tingling. Pain is made worse by: walking, standing, sitting, bending, lifting. She denies side effects from the current pain regimen. Patient reports that since the last follow up visit the physical functioning is worse, family/social relationships are unchanged, mood is unchanged and sleep patterns are unchanged, and that the overall functioning is worse. Patient denies neurological bowel or bladder. Presents for follow-up of ongoing low back and right leg pain. She underwent a right L5-S1 transforaminal epidural steroid injection on 7/7/2021 with no relief. She then underwent a L5-S1 interlaminar epidural steroid injection on 9/2/2021. Following this injection she reports 75% relief for greater than 6 months. She was able to perform more activities such as bending, lifting, walking and standing with less pain. Over the past month her pain has returned as she has been doing more yard work. She uses ibuprofen and Tylenol to control her pain. Lidocaine patches over-the- as well. She is interested in a repeat injection as pain is radiating down the posterior aspect of the right leg to the calf and heel. There is numbness and tingling associated in the lower leg and foot.   Denies neurogenic bowel or bladder or progressive weakness. Associated signs and symptoms:   Neurogenic bowel or bladder symptoms:  no   Perceived weakness:  no   Difficulty walking:  no              Past Medical History:   Past Medical History:   Diagnosis Date    Hypothyroidism       Past Surgical History:     Past Surgical History:   Procedure Laterality Date    DILATION AND CURETTAGE OF UTERUS      HYSTERECTOMY      TONSILLECTOMY       Current Medications:     Current Outpatient Medications:     tiZANidine (ZANAFLEX) 2 MG tablet, Take 1 tablet by mouth nightly as needed (muscle spasms), Disp: 30 tablet, Rfl: 0    levothyroxine (SYNTHROID) 75 MCG tablet, TAKE 1 TABLET BY MOUTH EVERY DAY, Disp: 90 tablet, Rfl: 3    Calcium-Magnesium-Vitamin D (CALCIUM MAGNESIUM PO), Take by mouth, Disp: , Rfl:     zinc 50 MG TABS tablet, Take 50 mg by mouth daily, Disp: , Rfl:     Cholecalciferol (VITAMIN D3) 50 MCG (2000 UT) CAPS, Take by mouth, Disp: , Rfl:     Biotin 1000 MCG TABS, Take by mouth, Disp: , Rfl:   Allergies:  Patient has no known allergies. Social History:    reports that she has never smoked. She has never used smokeless tobacco. She reports current alcohol use. She reports that she does not use drugs. Family History:   Family History   Problem Relation Age of Onset    Cancer Mother         breast, in [de-identified]    Diabetes Father     Heart Disease Father         chf, heart attack       REVIEW OF SYSTEMS:   CONSTITUTIONAL: Denies unexplained weight loss, fevers, chills or fatigue  NEUROLOGICAL: Denies unsteady gait or progressive weakness  MUSCULOSKELETAL: Denies joint swelling or redness  GI: Denies nausea, vomiting, diarrhea   : Denies bowel or bladder issues       PHYSICAL EXAM:    Vitals: Height 5' 2\" (1.575 m), weight 117 lb 4.8 oz (53.2 kg), not currently breastfeeding. GENERAL EXAM:  · General Apparence: Patient is adequately groomed with no evidence of malnutrition.   · Psychiatric: Orientation: The patient is oriented to time, place and person. The patient's mood and affect are appropriate   · Vascular: Examination reveals no swelling and palpation reveals no tenderness in upper or lower extremities. Good capillary refill. · The lymphatic examination of the neck, axillae and groin reveals all areas to be without enlargement or induration  · Sensation is intact without deficit in the upper and lower extremities to light touch and pinprick  · Coordination of the upper and lower extremities are normal.  · RIGHT UPPER EXTREMITY:  Inspection/examination of the right upper extremity does not show any tenderness, deformity or injury. Range of motion is unremarkable and pain-free. There is no gross instability. There are no rashes, ulcerations or lesions. Strength and tone are normal. No atrophy or abnormal movements are noted. · LEFT UPPER EXTREMITY: Inspection/examination of the left upper extremity does not show any tenderness, deformity or injury. Range of motion is unremarkable and pain-free. There is no gross instability. There are no rashes, ulcerations or lesions. Strength and tone are normal. No atrophy or abnormal movements are noted. LUMBAR/SACRAL EXAMINATION:  · Inspection: Local inspection shows no step-off or bruising. Lumbar alignment is normal. No instability is noted. · Palpation:   No evidence of tenderness at the midline. Lumbar paraspinal tenderness: Mild L4/5 and L5/S1 tenderness  Bursal tenderness No tenderness bilaterally  There is no paraspinal spasm. · Range of Motion: limited by 25% in all planes due to pain  · Strength:   Strength testing is 5/5 in all muscle groups tested. · Special Tests:   Straight leg raise positive right and crossed SLR negative. Mick's testing is negative bilaterally. FADIR's testing is negative bilaterally. · Skin: There are no rashes, ulcerations or lesions. · Reflexes: Reflexes are symmetrically 2+ at the patellar and ankle tendons.   Clonus absent bilaterally at the feet.  · Gait & station: normal, patient ambulates without assistance  · Additional Examinations:  · RIGHT LOWER EXTREMITY: Inspection/examination of the right lower extremity does not show any tenderness, deformity or injury. Range of motion is normal and pain-free. There is no gross instability. There are no rashes, ulcerations or lesions. Strength and tone are normal. No atrophy or abnormal movements are noted. · LEFT LOWER EXTREMITY:  Inspection/examination of the left lower extremity does not show any tenderness, deformity or injury. Range of motion is normal and pain-free. There is no gross instability. There are no rashes, ulcerations or lesions. Strength and tone are normal. No atrophy or abnormal movements are noted. Diagnostic Testing:    MR Lumbar spine shows    ALIGNMENT: There is mild dextroconvex scoliosis of the lumbar spine. There is 5 mm grade I retrolisthesis at T12-L1. Subtle grade I retrolisthesis of L1-L2. There is 5 mm grade I anterolisthesis of L4-L5.       BONES: Vertebral body heights are normal. There is moderate Modic I degenerative signal at T12-L1. Combination of Modic I and Modic II degenerative signal at L5-S1.       RETROPERITONEUM: Normal.       PARASPINAL: Normal paraspinal muscle signal for age.       T12-L1: Severe disc space narrowing. There is disc bulge superimposed on grade I retrolisthesis mildly compressing thecal sac greater on the right. Mild facet osteoarthritis. There is severe right foraminal stenosis and mild left foraminal stenosis.       L1-L2: Disc bulge mildly compresses thecal sac. Mild facet osteoarthritis, but no central stenosis. Mild left foraminal stenosis.       L2-L3: Disc bulge and moderate left facet osteoarthritis are present. Mild thecal sac compression, but no significant central stenosis. There is mild left foraminal stenosis.       L3-L4: Disc bulge and severe left greater than right facet osteoarthritis are present.  There is mild bilateral foraminal stenosis. Mild thecal sac compression, but no significant central stenosis.       L4-L5: Degenerative disc height loss and grade I spondylolisthesis are present. Severe facet osteoarthritis. There is mild to moderate bilateral recess stenosis. Mild bilateral foraminal stenosis.       L5-S1: There is moderate degenerative disc height loss. A right central/subarticular disc protrusion impinges the right S1 nerve root. Results for orders placed or performed in visit on 22   TSH with Reflex   Result Value Ref Range    TSH 0.50 0.27 - 4.20 uIU/mL   T4, Free   Result Value Ref Range    T4 Free 1.5 0.9 - 1.8 ng/dL   T3, Free   Result Value Ref Range    T3, Free 2.3 2.3 - 4.2 pg/mL     Impression:       1. Lumbar radiculopathy    2. Spondylolisthesis of lumbar region    3. HNP (herniated nucleus pulposus), lumbar        Plan:  Clinical Course: Above diagnoses are worsening    I discussed the diagnosis and the treatment options with Vivek Garcia today. In Summary:  The various treatment options were outlined and discussed with Vivek Garcia including:  Conservative care options: physical therapy, ice, medications, bracing, and activity modification. The indications for therapeutic injections. The indications for additional imaging/laboratory studies. The indications for (possible future) interventions. After considering the various options discussed, Vivek Garcia elected to pursue a course of treatment that includes the followin. Medications:  I will add a tizanidine 2 mg nightly to the current regimen. Counseled on risks, benefits and alternatives and recommended not to take the medicine and drive or operate heavy machinery. 2. PT:  Encouraged to continue with HEP. 3. Further studies:  No further studies. 4. Interventional:  We discussed pursuing a L5/S1 interlaminar epidural steroid injection to address the pain.   Radiologic imaging and symptoms confirm the pain etiology. Risks, benefits and alternatives of interventional options were discussed. These include and are not limited to bleeding, infection, spinal headache, nerve injury and lack of pain relief. The patient verbalized understanding and would like to proceed. The patient will be scheduled accordingly. The patient had 6 months of 75% relief following the L5/S1 IL KEYSHA in September 2021 with Dr. Zoila Wang. May need to consider transforaminal approach if no relief from interlaminar KEYSHA this time. 5. Follow up:  4-6 weeks with Kavon Duke PA-C      Luisa Ford was instructed to call the office if her symptoms worsen or if new symptoms appear prior to the next scheduled visit. She is specifically instructed to contact the office between now & her scheduled appointment if she has concerns related to her condition or if she needs assistance in scheduling the above tests. She is welcome to call for an appointment sooner if she has any additional concerns or questions. Marleen Coleman PA-C  Board Certified by the [employer] on Certification of [facility] and [address] [address]               This dictation was performed with a verbal recognition program [facility] ) and it was checked for errors. It is possible that there are still dictated errors within this office note. If so, please bring any errors to my attention for an addendum. All efforts were made to ensure that this office note is accurate.

## 2022-05-31 DIAGNOSIS — M43.16 SPONDYLOLISTHESIS OF LUMBAR REGION: ICD-10-CM

## 2022-05-31 DIAGNOSIS — M51.26 HNP (HERNIATED NUCLEUS PULPOSUS), LUMBAR: ICD-10-CM

## 2022-05-31 DIAGNOSIS — M54.16 LUMBAR RADICULOPATHY: ICD-10-CM

## 2022-05-31 RX ORDER — TIZANIDINE 2 MG/1
2 TABLET ORAL NIGHTLY PRN
Qty: 30 TABLET | Refills: 0 | Status: SHIPPED | OUTPATIENT
Start: 2022-05-31

## 2022-05-31 NOTE — TELEPHONE ENCOUNTER
Patient last seen 2022 and medication last filled 2022:    Tizanidine 2 mg    Impression:         1. Lumbar radiculopathy    2. Spondylolisthesis of lumbar region    3. HNP (herniated nucleus pulposus), lumbar          Plan:  Clinical Course: Above diagnoses are worsening     I discussed the diagnosis and the treatment options with Duane Cid today.      In Summary:  The various treatment options were outlined and discussed with Duane Cid including:  Conservative care options: physical therapy, ice, medications, bracing, and activity modification. The indications for therapeutic injections. The indications for additional imaging/laboratory studies. The indications for (possible future) interventions.      After considering the various options discussed, Duane Cid elected to pursue a course of treatment that includes the followin. Medications:  I will add a tizanidine 2 mg nightly to the current regimen. Counseled on risks, benefits and alternatives and recommended not to take the medicine and drive or operate heavy machinery.     2. PT:  Encouraged to continue with HEP.     3. Further studies:  No further studies.     4. Interventional:  We discussed pursuing a L5/S1 interlaminar epidural steroid injection to address the pain. Radiologic imaging and symptoms confirm the pain etiology. Risks, benefits and alternatives of interventional options were discussed. These include and are not limited to bleeding, infection, spinal headache, nerve injury and lack of pain relief. The patient verbalized understanding and would like to proceed. The patient will be scheduled accordingly. The patient had 6 months of 75% relief following the L5/S1 IL KEYSHA in 2021 with Dr. Abhishek Hector. May need to consider transforaminal approach if no relief from interlaminar KEYSHA this time.      5.  Follow up:  4-6 weeks with Alex Robles PA-C

## 2022-06-02 ENCOUNTER — TELEPHONE (OUTPATIENT)
Dept: ORTHOPEDIC SURGERY | Age: 77
End: 2022-06-02

## 2022-06-02 NOTE — TELEPHONE ENCOUNTER
Other PATIENT CALLED STATES THAT SHE HAD TO CANCEL HER LUMBAR INJECTION BECAUSE SHE TESTED POSITIVE FOR COVID. PATIENT STATES THAT SHE HAS BEEN UNABLE TO SLEEP. NO NECESSARILY DUE TO PAIN BUT SHE IS WONDERING IF ITS THE MEDICATION SHE IS ON OR A COVIDE SYMPTOM.  PLS CALL PATIENT TO ADVISE [phone]

## 2022-06-16 ENCOUNTER — HOSPITAL ENCOUNTER (OUTPATIENT)
Dept: INTERVENTIONAL RADIOLOGY/VASCULAR | Age: 77
Discharge: HOME | End: 2022-06-16
Payer: MEDICARE

## 2022-06-16 DIAGNOSIS — M54.16 LUMBAR RADICULOPATHY: ICD-10-CM

## 2022-06-16 PROCEDURE — 62323 NJX INTERLAMINAR LMBR/SAC: CPT

## 2022-06-16 PROCEDURE — 2709999900 HC NON-CHARGEABLE SUPPLY

## 2022-06-16 PROCEDURE — 6360000004 HC RX CONTRAST MEDICATION: Performed by: PAIN MEDICINE

## 2022-06-16 RX ADMIN — IOHEXOL 2 ML: 180 INJECTION INTRAVENOUS at 08:56

## 2022-06-16 NOTE — OP NOTE
PRE-OP DIAGNOSIS: Lumbar radiculopathy    POST-OP DIAGNOSIS: Same    PROCEDURE PERFORMED: Interlaminar Epidural Steroid Injection under fluoroscopy. Anesthesia: Local    COMPLICATIONS: None    ESTIMATED BLOOD LOSS: None    INFORMED CONSENT:  The patient has undergone the educational process involved with this procedure. The patient is aware and fully understands the risks involved with this procedure, potential damage to any and all body organs including possible bleeding, infection, spinal cord injury, nerve injury, swelling, rash, allergic reaction, need for surgery or repeated procedures, hospital admission, increased pain, unalleviated pain, seizures, paralysis and death. The patient understands that the potential for infection could result in prolonged medication prescription and requiring medical treatment. The patient also understands that all sterile surgical techniques will be followed and that the procedure will be undertaken in a safe, controlled, and monitored setting. The patient recognizes that the benefits include relief from pain and reduction in the oral use of pain medications, a return to a more functional lifestyle and improvement in the quality of life. Alternatives were also discussed. A brief examination was conducted and the pertinent imaging studies and records were reviewed. The patient verbalized understanding and was given an opportunity to ask questions which were answered to patient's satisfaction, and agreed to go ahead with the procedure. Levels: L5-S1 right paramedian    TECHNIQUE: After obtaining the informed consent, the patient was brought to the fluoroscopy suite and was positioned prone on the fluoro table. Patient's lower back and sacral region was prepped with Duraprep x 3 and draped with strict aseptic precautions. Vital signs monitoring was done throughout. The patient remained conscious and conversant throughout the procedure.  Under C-arm fluoroscopy the desired interlaminar space was located and cephalo-caudad rotation of the fluoroscope was done to maximize the size of the interlaminar space. Local-skin infiltration was done with 1% buffered Lidocaine over the point of needle entry. A 18 gauge 3.5 inch Touhy epidural needle was advanced (down the beam) in the interlaminar space utilizing intermittent fluoroscopic guidance, with the stylet in position. Once the trajectory was established, a lateral view was obtained to assess the depth of the needle. Needle was advanced in the lateral view and after the ligamentum flavum was entered a glass loss of resistance syringe was attached to the Tuohy needle after stylet removal. After loss-of-resistance was achieved, the epidural space was entered and needle tip position was confirmed with fluoroscopy. Aspiration was negative for heme or CSF. The placement of needle in the epidural space was confirmed with injection of contrast (Omnipaque 180) under continuous real-time fluoroscopy which showed appropriate spread in the dorsal epidural space. There was no evidence of intrathecal or intravascular injection. The fluoroscope was then aligned in the contralateral oblique and AP view and injection of some more contrast was done under continuous real-time fluoroscopy which again confirmed spread of contrast in the epidural space with no evidence of intrathecal or intravascular injection. After negative aspirate for CSF or heme, a solution of 3 cc Lidocaine 1% and 80 mg Triamcinolone was administered without difficulty. Repeat fluoroscopy showed appropriate dilutional pattern. The needle was withdrawn and a sterile band-aid was applied. The patient tolerated the procedure well and is sent to recovery in a stable condition. Post procedure instructions are provided and the patient sent home after an adequate period of observation. Weakness: no    Post Block Instructions:  1. Ice Pack to the injection area if local pain.   2. Regular diet.  3. If diabetic, monitor blood sugars. 4. Activity as tolerated. 5. Continue own medicines. 6. If local bleeding / swelling apply pressure and inform. 7. If persistent weakness call office or go to the ER. 8. Follow up as scheduled.

## 2022-06-30 ENCOUNTER — TELEPHONE (OUTPATIENT)
Dept: ORTHOPEDIC SURGERY | Age: 77
End: 2022-06-30

## 2022-06-30 NOTE — TELEPHONE ENCOUNTER
Patient received L5-S1 IL KEYSHA on 6/16/2022 by Dr. Bhaskar Contreras. Patient reports having continued pain following her injection. Patient reports the pain has improved with sitting. Patient continues to have a pull and tingling in her right foot. Overall patient reports a 50% improvement with the injection. Patient reports she has been able to complete the HEP and feels this is helping her as well. Patient was concerned if she needed another injection. I advised the patient she would need an office visit to place another order for a secondary injection. The patient would like to hold off at this time and see if she improves more with the HEP and will call us to schedule with [facility] for follow up care.

## 2022-06-30 NOTE — TELEPHONE ENCOUNTER
General Question     Subject: PATIENT OF [facility] AND SHE HAD AN EPIDURAL ON THE 16TH . AND IS WISHING TO SPEAK TO SOMEONE REGARDING THE PAIN SHE IS STILL HAVING PAIN.  PLEASE ADVISE   Patient: Ezequiel Smith  Contact Number: [phone]

## 2022-07-19 ENCOUNTER — OFFICE VISIT (OUTPATIENT)
Dept: ORTHOPEDIC SURGERY | Age: 77
End: 2022-07-19
Payer: MEDICARE

## 2022-07-19 VITALS — HEIGHT: 62 IN | BODY MASS INDEX: 21.53 KG/M2 | WEIGHT: 117 LBS

## 2022-07-19 DIAGNOSIS — M54.16 LUMBAR RADICULOPATHY: Primary | ICD-10-CM

## 2022-07-19 PROCEDURE — 99213 OFFICE O/P EST LOW 20 MIN: CPT | Performed by: PHYSICIAN ASSISTANT

## 2022-07-19 PROCEDURE — 1123F ACP DISCUSS/DSCN MKR DOCD: CPT | Performed by: PHYSICIAN ASSISTANT

## 2022-07-19 NOTE — PROGRESS NOTES
Follow up: [facility]  1945  [id number]      CHIEF COMPLAINT:    Chief Complaint   Patient presents with    Back Pain     lumbar           HISTORY OF PRESENT ILLNESS:  Ms. Bautista Oliver is a 68 y.o. female returns for a follow up today on her low back and right leg pain. Patient has recently undergone a interlaminar epidural steroid injection on the right at L5-S1 on 6/16/2022 by Dr. Isaura Degroot. Patient states that she received approximately 70% relief of her right leg pain with the injection. .    As per information/history obtained from the PADT(patient assessment and documentation tool) - She complains of pain in the lower back with radiation to the lower leg Right and feet Right She rates the pain 6/10 and describes it as aching, numbness, tingling. Pain is made worse by: walking, standing, sitting, bending, lifting. She denies side effects from the current pain regimen. Patient reports that since the last follow up visit the physical functioning is worse, family/social relationships are unchanged, mood is unchanged and sleep patterns are unchanged, and that the overall functioning is worse. Patient denies neurological bowel or bladder.    Pain Assessment  Location of Pain: Back  Severity of Pain: 5  Quality of Pain: Other (Comment)  Duration of Pain: Other (Comment)]    Associated signs and symptoms:   Neurogenic bowel or bladder symptoms:  no   Perceived weakness:  no   Difficulty walking:  no              Past Medical History:   Past Medical History:   Diagnosis Date    Hypothyroidism       Past Surgical History:     Past Surgical History:   Procedure Laterality Date    DILATION AND CURETTAGE OF UTERUS      HYSTERECTOMY (CERVIX STATUS UNKNOWN)      TONSILLECTOMY       Current Medications:     Current Outpatient Medications:     tiZANidine (ZANAFLEX) 2 MG tablet, TAKE 1 TABLET BY MOUTH NIGHTLY AS NEEDED (MUSCLE SPASMS), Disp: 30 tablet, Rfl: 0    levothyroxine (SYNTHROID) 75 MCG tablet, TAKE 1 TABLET BY MOUTH EVERY DAY, Disp: 90 tablet, Rfl: 3    Calcium-Magnesium-Vitamin D (CALCIUM MAGNESIUM PO), Take by mouth, Disp: , Rfl:     zinc 50 MG TABS tablet, Take 50 mg by mouth daily, Disp: , Rfl:     Cholecalciferol (VITAMIN D3) 50 MCG (2000 UT) CAPS, Take by mouth, Disp: , Rfl:     Biotin 1000 MCG TABS, Take by mouth, Disp: , Rfl:   Allergies:  Patient has no known allergies. Social History:    reports that she has never smoked. She has never used smokeless tobacco. She reports current alcohol use. She reports that she does not use drugs. Family History:   Family History   Problem Relation Age of Onset    Cancer Mother         breast, in [de-identified]    Diabetes Father     Heart Disease Father         chf, heart attack       REVIEW OF SYSTEMS:   CONSTITUTIONAL: Denies unexplained weight loss, fevers, chills or fatigue  NEUROLOGICAL: Denies unsteady gait or progressive weakness  MUSCULOSKELETAL: Denies joint swelling or redness  GI: Denies nausea, vomiting, diarrhea   : Denies bowel or bladder issues       PHYSICAL EXAM:    Vitals: Height 5' 2.01\" (1.575 m), weight 117 lb (53.1 kg), not currently breastfeeding. GENERAL EXAM:  General Apparence: Patient is adequately groomed with no evidence of malnutrition. Psychiatric: Orientation: The patient is oriented to time, place and person. The patient's mood and affect are appropriate   Vascular: Examination reveals no swelling and palpation reveals no tenderness in upper or lower extremities. Good capillary refill. The lymphatic examination of the neck, axillae and groin reveals all areas to be without enlargement or induration  Sensation is intact without deficit in the upper and lower extremities to light touch and pinprick  Coordination of the upper and lower extremities are normal.  RIGHT UPPER EXTREMITY:  Inspection/examination of the right upper extremity does not show any tenderness, deformity or injury. Range of motion is unremarkable and pain-free.  There is no gross instability. There are no rashes, ulcerations or lesions. Strength and tone are normal. No atrophy or abnormal movements are noted. LEFT UPPER EXTREMITY: Inspection/examination of the left upper extremity does not show any tenderness, deformity or injury. Range of motion is unremarkable and pain-free. There is no gross instability. There are no rashes, ulcerations or lesions. Strength and tone are normal. No atrophy or abnormal movements are noted. LUMBAR/SACRAL EXAMINATION:  Inspection: Local inspection shows no step-off or bruising. Lumbar alignment is normal. No instability is noted. Palpation:   No evidence of tenderness at the midline. Lumbar paraspinal tenderness: Mild L4/5 and L5/S1 tenderness  Bursal tenderness No tenderness bilaterally  There is no paraspinal spasm. Range of Motion: limited by 25% in all planes due to pain  Strength:   Strength testing is 5/5 in all muscle groups tested. Special Tests:   Straight leg raise positive right and crossed SLR negative. Mick's testing is negative bilaterally. FADIR's testing is negative bilaterally. Skin: There are no rashes, ulcerations or lesions. Reflexes: Reflexes are symmetrically 2+ at the patellar and ankle tendons. Clonus absent bilaterally at the feet. Gait & station: normal, patient ambulates without assistance  Additional Examinations:  RIGHT LOWER EXTREMITY: Inspection/examination of the right lower extremity does not show any tenderness, deformity or injury. Range of motion is normal and pain-free. There is no gross instability. There are no rashes, ulcerations or lesions. Strength and tone are normal. No atrophy or abnormal movements are noted. LEFT LOWER EXTREMITY:  Inspection/examination of the left lower extremity does not show any tenderness, deformity or injury. Range of motion is normal and pain-free. There is no gross instability. There are no rashes, ulcerations or lesions.   Strength and tone are normal. No atrophy or abnormal movements are noted. Diagnostic Testing:    MR Lumbar spine shows    ALIGNMENT: There is mild dextroconvex scoliosis of the lumbar spine. There is 5 mm grade I retrolisthesis at T12-L1. Subtle grade I retrolisthesis of L1-L2. There is 5 mm grade I anterolisthesis of L4-L5. BONES: Vertebral body heights are normal. There is moderate Modic I degenerative signal at T12-L1. Combination of Modic I and Modic II degenerative signal at L5-S1. RETROPERITONEUM: Normal.       PARASPINAL: Normal paraspinal muscle signal for age. T12-L1: Severe disc space narrowing. There is disc bulge superimposed on grade I retrolisthesis mildly compressing thecal sac greater on the right. Mild facet osteoarthritis. There is severe right foraminal stenosis and mild left foraminal stenosis. L1-L2: Disc bulge mildly compresses thecal sac. Mild facet osteoarthritis, but no central stenosis. Mild left foraminal stenosis. L2-L3: Disc bulge and moderate left facet osteoarthritis are present. Mild thecal sac compression, but no significant central stenosis. There is mild left foraminal stenosis. L3-L4: Disc bulge and severe left greater than right facet osteoarthritis are present. There is mild bilateral foraminal stenosis. Mild thecal sac compression, but no significant central stenosis. L4-L5: Degenerative disc height loss and grade I spondylolisthesis are present. Severe facet osteoarthritis. There is mild to moderate bilateral recess stenosis. Mild bilateral foraminal stenosis. L5-S1: There is moderate degenerative disc height loss. A right central/subarticular disc protrusion impinges the right S1 nerve root. Impression: DDD lumbar spine  Foraminal stenosis  Facet arthropathy        Plan:  Today we discussed the diagnosis and treatment plan and the patient is currently continue with her home stretching and strengthening program along with her ambulation program.  She will add turmeric 1000 to 2000 mg a day-and she will contact the office if she would like to proceed with a second KEYSHA with . Jennifer Nava was instructed to call the office if her symptoms worsen or if new symptoms appear prior to the next scheduled visit. She is specifically instructed to contact the office between now & her scheduled appointment if she has concerns related to her condition or if she needs assistance in scheduling the above tests. She is welcome to call for an appointment sooner if she has any additional concerns or questions. Total evaluation time 20 minutes    Claribel Bower PA-C  Board certified by the [facility]              This dictation was performed with a verbal recognition program [facility]) and it was checked for errors. It is possible that there are still dictated errors within this office note. If so, please bring any errors to my attention for an addendum. All efforts were made to ensure that this office note is accurate.

## 2022-08-17 NOTE — TELEPHONE ENCOUNTER
Discharge Instructions following Sedation or Anesthesia:  You have  received  a sedative/anesthetic therefore, you should not consume any alcoholic beverages for minimum of 12 hours. Do not drive or operate machinery for 24 hours. Do not sign legal documents for 24 hours. Dizziness, drowsiness, and unsteadiness may occur. Rest when need to. Increase diet as tolerated. Keep up on fluids if diet allows. General Instructions:  Do not take a tub bath for 72 hours after procedure (this includes hot tubs and swimming pools). You may shower, but avoid hot water to injection site. Avoid strenuous activity TODAY especially if you experience dizziness. Remove band-aid the next day. Wash off any residual iodine   Do not use heat, heating pad, or any other heating device over the injection site for 3 days after the procedure. If you experience pain after your procedure, you may continue with your current pain medication as prescribed. (DO NOT INCREASE YOUR PAIN MEDICATION WITHOUT TALKING TO DOCTOR)  Soreness and pain at injection site is common, may use ice to reduce soreness. Please complete pain diary as instructed.      Call Jailene Sofia at [phone] if you experience:   Fever, chills or temperature over 100    Vomiting, Headache, persistent stiff neck, nausea, blurred vision   Difficulty in urinating or unable to urinate with 8 hours   Increase in weakness, numbness or loss of function   Increased redness, swelling or drainage at the injection site sw Patient called in regards to her question of if the medication she is taking is causing her to not sleep. Patient is currently only being prescribed tizanidine by provider Madison Plata. This medication would not prevent the patient from sleep. Other medications she would need to contact her PCP on. Patient advised to follow protocol for rescheduling injection.

## 2023-02-03 RX ORDER — LEVOTHYROXINE SODIUM 0.07 MG/1
TABLET ORAL
Qty: 90 TABLET | Refills: 0 | Status: SHIPPED | OUTPATIENT
Start: 2023-02-03

## 2023-02-14 ENCOUNTER — TELEPHONE (OUTPATIENT)
Dept: INTERNAL MEDICINE CLINIC | Age: 78
End: 2023-02-14

## 2023-02-14 NOTE — TELEPHONE ENCOUNTER
----- Message from Erika Herr sent at 2/14/2023 12:55 PM EST -----  Subject: Message to Provider    QUESTIONS  Information for Provider? Patient wants to see if her living will was sent   over with her records from Dr. Pugh Medici office. ---------------------------------------------------------------------------  --------------  Becky LUNDY  [phone]; OK to leave message on voicemail  ---------------------------------------------------------------------------  --------------  SCRIPT ANSWERS  Relationship to Patient?  Self

## 2023-04-06 ENCOUNTER — OFFICE VISIT (OUTPATIENT)
Dept: INTERNAL MEDICINE CLINIC | Age: 78
End: 2023-04-06
Payer: MEDICARE

## 2023-04-06 VITALS
BODY MASS INDEX: 20.98 KG/M2 | WEIGHT: 114 LBS | HEIGHT: 62 IN | DIASTOLIC BLOOD PRESSURE: 68 MMHG | SYSTOLIC BLOOD PRESSURE: 120 MMHG

## 2023-04-06 DIAGNOSIS — E78.00 PURE HYPERCHOLESTEROLEMIA: ICD-10-CM

## 2023-04-06 DIAGNOSIS — Z00.00 MEDICARE ANNUAL WELLNESS VISIT, SUBSEQUENT: Primary | ICD-10-CM

## 2023-04-06 DIAGNOSIS — E03.9 ACQUIRED HYPOTHYROIDISM: ICD-10-CM

## 2023-04-06 DIAGNOSIS — E55.9 VITAMIN D DEFICIENCY: ICD-10-CM

## 2023-04-06 PROCEDURE — G0439 PPPS, SUBSEQ VISIT: HCPCS | Performed by: INTERNAL MEDICINE

## 2023-04-06 PROCEDURE — 1123F ACP DISCUSS/DSCN MKR DOCD: CPT | Performed by: INTERNAL MEDICINE

## 2023-04-06 SDOH — ECONOMIC STABILITY: INCOME INSECURITY: HOW HARD IS IT FOR YOU TO PAY FOR THE VERY BASICS LIKE FOOD, HOUSING, MEDICAL CARE, AND HEATING?: NOT HARD AT ALL

## 2023-04-06 SDOH — ECONOMIC STABILITY: FOOD INSECURITY: WITHIN THE PAST 12 MONTHS, YOU WORRIED THAT YOUR FOOD WOULD RUN OUT BEFORE YOU GOT MONEY TO BUY MORE.: NEVER TRUE

## 2023-04-06 SDOH — ECONOMIC STABILITY: FOOD INSECURITY: WITHIN THE PAST 12 MONTHS, THE FOOD YOU BOUGHT JUST DIDN'T LAST AND YOU DIDN'T HAVE MONEY TO GET MORE.: NEVER TRUE

## 2023-04-06 SDOH — ECONOMIC STABILITY: HOUSING INSECURITY
IN THE LAST 12 MONTHS, WAS THERE A TIME WHEN YOU DID NOT HAVE A STEADY PLACE TO SLEEP OR SLEPT IN A SHELTER (INCLUDING NOW)?: NO

## 2023-04-06 ASSESSMENT — PATIENT HEALTH QUESTIONNAIRE - PHQ9
SUM OF ALL RESPONSES TO PHQ9 QUESTIONS 1 & 2: 1
2. FEELING DOWN, DEPRESSED OR HOPELESS: 1
SUM OF ALL RESPONSES TO PHQ QUESTIONS 1-9: 1
1. LITTLE INTEREST OR PLEASURE IN DOING THINGS: 0
SUM OF ALL RESPONSES TO PHQ QUESTIONS 1-9: 1

## 2023-04-06 ASSESSMENT — LIFESTYLE VARIABLES
HOW MANY STANDARD DRINKS CONTAINING ALCOHOL DO YOU HAVE ON A TYPICAL DAY: 1 OR 2
HOW OFTEN DURING THE LAST YEAR HAVE YOU HAD A FEELING OF GUILT OR REMORSE AFTER DRINKING: 0
HOW OFTEN DURING THE LAST YEAR HAVE YOU NEEDED AN ALCOHOLIC DRINK FIRST THING IN THE MORNING TO GET YOURSELF GOING AFTER A NIGHT OF HEAVY DRINKING: 0
HAVE YOU OR SOMEONE ELSE BEEN INJURED AS A RESULT OF YOUR DRINKING: 0
HOW OFTEN DURING THE LAST YEAR HAVE YOU BEEN UNABLE TO REMEMBER WHAT HAPPENED THE NIGHT BEFORE BECAUSE YOU HAD BEEN DRINKING: 0
HAS A RELATIVE, FRIEND, DOCTOR, OR ANOTHER HEALTH PROFESSIONAL EXPRESSED CONCERN ABOUT YOUR DRINKING OR SUGGESTED YOU CUT DOWN: 0
HOW OFTEN DURING THE LAST YEAR HAVE YOU FOUND THAT YOU WERE NOT ABLE TO STOP DRINKING ONCE YOU HAD STARTED: 0
HOW OFTEN DO YOU HAVE A DRINK CONTAINING ALCOHOL: 4 OR MORE TIMES A WEEK
HOW OFTEN DURING THE LAST YEAR HAVE YOU FAILED TO DO WHAT WAS NORMALLY EXPECTED FROM YOU BECAUSE OF DRINKING: 0

## 2023-04-06 NOTE — PROGRESS NOTES
Medicare Annual Wellness Visit    Doug Berman is here for Medicare AWV    Assessment & Plan   Medicare annual wellness visit, subsequent  Acquired hypothyroidism  -Levothyroxine 75 mcg daily  -     Comprehensive Metabolic Panel; Future  -     Lipid Panel; Future  -     CBC with Auto Differential; Future  -     TSH with Reflex; Future  -     Vitamin D 25 Hydroxy; Future  Pure hypercholesterolemia  -Monitor  -     Comprehensive Metabolic Panel; Future  -     Lipid Panel; Future  -     CBC with Auto Differential; Future  -     TSH with Reflex; Future  -     Vitamin D 25 Hydroxy; Future  Vitamin D deficiency  -Continue supplement, monitor. Does not want to use medications for bone density similar getting DEXA  -     Vitamin D 25 Hydroxy; Future      Recommendations for Preventive Services Due: see orders and patient instructions/AVS.  Recommended screening schedule for the next 5-10 years is provided to the patient in written form: see Patient Instructions/AVS.     Return in about 1 year (around 4/6/2024) for AWV. Subjective       Patient's complete Health Risk Assessment and screening values have been reviewed and are found in Flowsheets. The following problems were reviewed today and where indicated follow up appointments were made and/or referrals ordered.     Positive Risk Factor Screenings with Interventions:               General HRA Questions:  Select all that apply: (!) Stress    Stress Interventions:  Stress is related to her sister passing away a couple of weeks ago, and her daughter-in-law's OCD which affects her relationship with her grandkids, at this point she is coping well         Hearing Screen:  Do you or your family notice any trouble with your hearing that hasn't been managed with hearing aids?: (!) Yes    Interventions:  see audiology as needed                       Objective   Vitals:    04/06/23 0946   BP: 120/68   Site: Left Upper Arm   Weight: 114 lb (51.7 kg)   Height: 5' 2\" (1.575 m)

## 2023-04-06 NOTE — PATIENT INSTRUCTIONS
These techniques combine exercise and meditation. You may need some training at first to learn them. Do something you enjoy. For example, listen to music or go to a movie. Practice your hobby or do volunteer work. Meditate. This can help you relax, because you are not worrying about what happened before or what may happen in the future. Do guided imagery. Imagine yourself in any setting that helps you feel calm. You can use online videos, books, or a teacher to guide you. Do breathing exercises. For example:  From a standing position, bend forward from the waist with your knees slightly bent. Let your arms dangle close to the floor. Breathe in slowly and deeply as you return to a standing position. Roll up slowly and lift your head last.  Hold your breath for just a few seconds in the standing position. Breathe out slowly and bend forward from the waist.  Let your feelings out. Talk, laugh, cry, and express anger when you need to. Talking with supportive friends or family, a counselor, or a franco leader about your feelings is a healthy way to relieve stress. Avoid discussing your feelings with people who make you feel worse. Write. It may help to write about things that are bothering you. This helps you find out how much stress you feel and what is causing it. When you know this, you can find better ways to cope. What can you do to prevent stress? Manage your time. This helps you find time to do the things you want and need to do. Get enough sleep. Your body recovers from the stresses of the day while you are sleeping. Get support. Your family, friends, and community can make a difference in how you experience stress. Limit your news feed. Avoid or limit time on social media or news that may make you feel stressed. Do something active. Exercise or activity can help reduce stress. Walking is a great way to get started. Where can you learn more?   Go to [email or web address]/ and enter N032

## 2023-05-01 ENCOUNTER — TELEPHONE (OUTPATIENT)
Dept: INTERNAL MEDICINE CLINIC | Age: 78
End: 2023-05-01

## 2023-05-01 NOTE — TELEPHONE ENCOUNTER
How long has she been trying it?  Even the prescription medications take months (at best)  Sometimes podiatry can also be helpful

## 2023-05-01 NOTE — TELEPHONE ENCOUNTER
Patient called in and said she has tried some OTC meds but nothing is working    She cant get rid of toe fungus    She would like to know is there something that can be given or done        Please advise and call

## 2023-05-01 NOTE — TELEPHONE ENCOUNTER
Spoke to pt she stated she has tried OTC off and on for 2 years. She wanted to know what would the podiatry do that's different.    She stated that she does not mind if it takes a few months if something will work that is called in '

## 2023-05-02 NOTE — TELEPHONE ENCOUNTER
The podiatrist can help with trimming the diseased nails back.  We can try a topical one, there is an oral medication but has potential for side effects so would have to discuss before starting

## 2023-05-12 ENCOUNTER — TELEPHONE (OUTPATIENT)
Dept: INTERNAL MEDICINE CLINIC | Age: 78
End: 2023-05-12

## 2023-05-12 RX ORDER — LEVOTHYROXINE SODIUM 0.07 MG/1
75 TABLET ORAL DAILY
Qty: 90 TABLET | Refills: 1 | Status: SHIPPED | OUTPATIENT
Start: 2023-05-12

## 2023-09-07 ENCOUNTER — OFFICE VISIT (OUTPATIENT)
Dept: ORTHOPEDIC SURGERY | Age: 78
End: 2023-09-07
Payer: MEDICARE

## 2023-09-07 VITALS — HEIGHT: 62 IN | BODY MASS INDEX: 20.98 KG/M2 | WEIGHT: 114 LBS

## 2023-09-07 DIAGNOSIS — M43.16 SPONDYLOLISTHESIS OF LUMBAR REGION: ICD-10-CM

## 2023-09-07 DIAGNOSIS — M51.26 HNP (HERNIATED NUCLEUS PULPOSUS), LUMBAR: ICD-10-CM

## 2023-09-07 DIAGNOSIS — M54.16 LUMBAR RADICULOPATHY: Primary | ICD-10-CM

## 2023-09-07 PROCEDURE — 1123F ACP DISCUSS/DSCN MKR DOCD: CPT | Performed by: PHYSICIAN ASSISTANT

## 2023-09-07 PROCEDURE — 99214 OFFICE O/P EST MOD 30 MIN: CPT | Performed by: PHYSICIAN ASSISTANT

## 2023-09-07 NOTE — PROGRESS NOTES
Examinations:  RIGHT LOWER EXTREMITY: Inspection/examination of the right lower extremity does not show any tenderness, deformity or injury. Range of motion is normal and pain-free. There is no gross instability. There are no rashes, ulcerations or lesions. Strength and tone are normal. No atrophy or abnormal movements are noted. LEFT LOWER EXTREMITY:  Inspection/examination of the left lower extremity does not show any tenderness, deformity or injury. Range of motion is normal and pain-free. There is no gross instability. There are no rashes, ulcerations or lesions. Strength and tone are normal. No atrophy or abnormal movements are noted. Diagnostic Testing:    MR Lumbar spine shows 6/5/2021  ALIGNMENT: There is mild dextroconvex scoliosis of the lumbar spine. There is 5 mm grade I retrolisthesis at T12-L1. Subtle grade I retrolisthesis of L1-L2. There is 5 mm grade I anterolisthesis of L4-L5. BONES: Vertebral body heights are normal. There is moderate Modic I degenerative signal at T12-L1. Combination of Modic I and Modic II degenerative signal at L5-S1. RETROPERITONEUM: Normal.       PARASPINAL: Normal paraspinal muscle signal for age. T12-L1: Severe disc space narrowing. There is disc bulge superimposed on grade I retrolisthesis mildly compressing thecal sac greater on the right. Mild facet osteoarthritis. There is severe right foraminal stenosis and mild left foraminal stenosis. L1-L2: Disc bulge mildly compresses thecal sac. Mild facet osteoarthritis, but no central stenosis. Mild left foraminal stenosis. L2-L3: Disc bulge and moderate left facet osteoarthritis are present. Mild thecal sac compression, but no significant central stenosis. There is mild left foraminal stenosis. L3-L4: Disc bulge and severe left greater than right facet osteoarthritis are present. There is mild bilateral foraminal stenosis.  Mild thecal sac compression, but no significant central

## 2023-09-15 ENCOUNTER — TELEPHONE (OUTPATIENT)
Dept: ORTHOPEDIC SURGERY | Age: 78
End: 2023-09-15

## 2023-09-15 NOTE — TELEPHONE ENCOUNTER
General Question     Subject: BACK IS BETTER, STILL TINGLING IN THE LEG  Patient and /or Facility Request: [facility]  Contact Number: [phone]    Pt's BACK IS BETTER AND SHE DID RCV THE PA LETTER FOR HER MRI. SINCE SHE IS FEELING BETTER, SHOULD SHE DELAY THE MRI? OR, SHOULD SHE GO AHEAD WITH IT?     PLEASE CALL TO ADVISE @ THE CELL # TODAY.

## 2023-11-07 RX ORDER — LEVOTHYROXINE SODIUM 0.07 MG/1
75 TABLET ORAL DAILY
Qty: 90 TABLET | Refills: 1 | Status: SHIPPED | OUTPATIENT
Start: 2023-11-07

## 2024-01-16 ENCOUNTER — TELEPHONE (OUTPATIENT)
Dept: ORTHOPEDIC SURGERY | Age: 79
End: 2024-01-16

## 2024-01-16 NOTE — TELEPHONE ENCOUNTER
Pt RETURNING YOUR CALL. WILL CONNECT IN TEAMS AND ATTEMPT TO REACH OUT, AND SEND ANOTHER TE IF UNABLE TO MAKE CONTACT.

## 2024-01-16 NOTE — TELEPHONE ENCOUNTER
General Question     Subject: Pt IS RETURNING THE CALL   Patient and /or Facility Request: Humaira Ghotra  Contact Number: [phone]     Pt WILL BE IN THE REST OF THE DAY, SO YOU CAN CALL AT YOUR CONVENIENCE.

## 2024-02-06 ENCOUNTER — OFFICE VISIT (OUTPATIENT)
Dept: ORTHOPEDIC SURGERY | Age: 79
End: 2024-02-06
Payer: MEDICARE

## 2024-02-06 VITALS — HEIGHT: 62 IN | BODY MASS INDEX: 20.98 KG/M2 | WEIGHT: 114 LBS

## 2024-02-06 DIAGNOSIS — M54.50 LUMBAR PAIN: Primary | ICD-10-CM

## 2024-02-06 PROCEDURE — 1123F ACP DISCUSS/DSCN MKR DOCD: CPT | Performed by: PHYSICIAN ASSISTANT

## 2024-02-06 PROCEDURE — 99214 OFFICE O/P EST MOD 30 MIN: CPT | Performed by: PHYSICIAN ASSISTANT

## 2024-02-06 NOTE — PROGRESS NOTES
Follow up: SPINE    Humaira Ghotra  1945  [id number]      CHIEF COMPLAINT:    Chief Complaint   Patient presents with    Back Pain     lumbar           HISTORY OF PRESENT ILLNESS:  Ms. Ghotra is a 78 y.o. female returns for a follow up today on her low back and left leg pain.  Patient states that she has been doing relatively well and did not need to have the MRI performed because she was having very little pain.  She states at the present time she is having 0-6/10 pain in her lower back with very intermittent radiation of pain into her left leg.  She states that she is having 5 good days a week and only 2 not so good days where she states that she is able to still function but her pain is around a 6 out of 10.  She denies any new radicular symptoms, weakness, bowel or bladder dysfunction.  She only takes ibuprofen as needed with benefit  Pain Assessment  Location of Pain: Back  Location Modifiers: Other (Comment)  Severity of Pain: 6  Quality of Pain: Other (Comment)  Duration of Pain: Other (Comment)  Frequency of Pain: Other (Comment)  Aggravating Factors: Other (Comment)  Relieving Factors: Other (Comment)]  9/7/2023    Patient has had a interlaminar epidural steroid injection on the right at L5-S1 on 6/16/2022 by Dr. Smith.  Patient states that she received approximately 70% relief of her right leg pain with the injection.  Patient states that over the past few months she has been experiencing increasing pain within the posterior lateral aspect of the right leg to her foot and now she is experiencing left posterior lateral leg pain with numbness and tingling into the left leg.  She states that the pain is increased with prolonged standing and physical activity and improved with sitting and rest.  Has been taking no medication specifically for the back and leg pain.  She denies any progressive weakness or bowel or bladder dysfunction.  .    As per information/history obtained from the PADT(patient

## 2024-02-12 ENCOUNTER — TELEPHONE (OUTPATIENT)
Dept: ORTHOPEDIC SURGERY | Age: 79
End: 2024-02-12

## 2024-02-12 NOTE — TELEPHONE ENCOUNTER
General Question     Subject: MEDICATION   Patient and /or Facility Request: Humaira Ghotra   Contact Number: [phone]     PATIENT CALLING REQUESTING A CALL BACK FROM SOMEONE IN BRIAN ADKINS'S OFFICE REGARDING THE MEDICATION HE SUGGESTED FOR HER PAIN. PATIENT CAN'T REMEMBER IF IT WAS ADVIL OR ALEVE '      PLEASE CALL THE PATIENT BACK AT THE ABOVE NUMBER

## 2024-05-08 RX ORDER — LEVOTHYROXINE SODIUM 0.07 MG/1
75 TABLET ORAL DAILY
Qty: 90 TABLET | Refills: 1 | OUTPATIENT
Start: 2024-05-08

## 2024-05-13 RX ORDER — LEVOTHYROXINE SODIUM 0.07 MG/1
75 TABLET ORAL DAILY
Qty: 30 TABLET | Refills: 0 | Status: SHIPPED | OUTPATIENT
Start: 2024-05-13

## 2024-05-13 NOTE — TELEPHONE ENCOUNTER
Patient is requesting the following refill       levothyroxine (SYNTHROID) 75 MCG tablet       [facility]/pharmacy #[id number] - [place], [place] - [address] - P [phone] - F [phone]

## 2024-05-14 ENCOUNTER — TELEPHONE (OUTPATIENT)
Dept: INTERNAL MEDICINE CLINIC | Age: 79
End: 2024-05-14

## 2024-05-14 RX ORDER — LEVOTHYROXINE SODIUM 0.07 MG/1
75 TABLET ORAL DAILY
Qty: 30 TABLET | Refills: 0 | Status: CANCELLED | OUTPATIENT
Start: 2024-05-14

## 2024-05-14 NOTE — TELEPHONE ENCOUNTER
Pt has made an appt for May 22nd @ 10:40  Can the Levothyroxine be filled?     [facility]/pharmacy #[id number] - [place] - [address] - P [phone] - F [phone]     Please advise pt at:  [phone]

## 2024-05-22 ENCOUNTER — OFFICE VISIT (OUTPATIENT)
Dept: INTERNAL MEDICINE CLINIC | Age: 79
End: 2024-05-22
Payer: MEDICARE

## 2024-05-22 VITALS
DIASTOLIC BLOOD PRESSURE: 62 MMHG | HEIGHT: 62 IN | SYSTOLIC BLOOD PRESSURE: 124 MMHG | WEIGHT: 116 LBS | BODY MASS INDEX: 21.35 KG/M2

## 2024-05-22 DIAGNOSIS — E78.00 PURE HYPERCHOLESTEROLEMIA: ICD-10-CM

## 2024-05-22 DIAGNOSIS — E03.9 ACQUIRED HYPOTHYROIDISM: Primary | ICD-10-CM

## 2024-05-22 DIAGNOSIS — E55.9 VITAMIN D DEFICIENCY: ICD-10-CM

## 2024-05-22 DIAGNOSIS — E03.9 ACQUIRED HYPOTHYROIDISM: ICD-10-CM

## 2024-05-22 LAB
25(OH)D3 SERPL-MCNC: 32.7 NG/ML
ALBUMIN SERPL-MCNC: 4.2 G/DL (ref 3.4–5)
ALBUMIN/GLOB SERPL: 1.7 {RATIO} (ref 1.1–2.2)
ALP SERPL-CCNC: 57 U/L (ref 40–129)
ALT SERPL-CCNC: 14 U/L (ref 10–40)
ANION GAP SERPL CALCULATED.3IONS-SCNC: 8 MMOL/L (ref 3–16)
AST SERPL-CCNC: 17 U/L (ref 15–37)
BASOPHILS # BLD: 0 K/UL (ref 0–0.2)
BASOPHILS NFR BLD: 0.6 %
BILIRUB SERPL-MCNC: 0.4 MG/DL (ref 0–1)
BUN SERPL-MCNC: 11 MG/DL (ref 7–20)
CALCIUM SERPL-MCNC: 9.3 MG/DL (ref 8.3–10.6)
CHLORIDE SERPL-SCNC: 100 MMOL/L (ref 99–110)
CHOLEST SERPL-MCNC: 196 MG/DL (ref 0–199)
CO2 SERPL-SCNC: 28 MMOL/L (ref 21–32)
CREAT SERPL-MCNC: 0.6 MG/DL (ref 0.6–1.2)
DEPRECATED RDW RBC AUTO: 13.5 % (ref 12.4–15.4)
EOSINOPHIL # BLD: 0.1 K/UL (ref 0–0.6)
EOSINOPHIL NFR BLD: 1.9 %
GFR SERPLBLD CREATININE-BSD FMLA CKD-EPI: >90 ML/MIN/{1.73_M2}
GLUCOSE SERPL-MCNC: 92 MG/DL (ref 70–99)
HCT VFR BLD AUTO: 40.1 % (ref 36–48)
HDLC SERPL-MCNC: 82 MG/DL (ref 40–60)
HGB BLD-MCNC: 13.7 G/DL (ref 12–16)
LDLC SERPL CALC-MCNC: 98 MG/DL
LYMPHOCYTES # BLD: 1.6 K/UL (ref 1–5.1)
LYMPHOCYTES NFR BLD: 34.9 %
MCH RBC QN AUTO: 33.8 PG (ref 26–34)
MCHC RBC AUTO-ENTMCNC: 34.3 G/DL (ref 31–36)
MCV RBC AUTO: 98.7 FL (ref 80–100)
MONOCYTES # BLD: 0.3 K/UL (ref 0–1.3)
MONOCYTES NFR BLD: 7.4 %
NEUTROPHILS # BLD: 2.5 K/UL (ref 1.7–7.7)
NEUTROPHILS NFR BLD: 55.2 %
PLATELET # BLD AUTO: 332 K/UL (ref 135–450)
PMV BLD AUTO: 8 FL (ref 5–10.5)
POTASSIUM SERPL-SCNC: 4.2 MMOL/L (ref 3.5–5.1)
PROT SERPL-MCNC: 6.7 G/DL (ref 6.4–8.2)
RBC # BLD AUTO: 4.06 M/UL (ref 4–5.2)
SODIUM SERPL-SCNC: 136 MMOL/L (ref 136–145)
TRIGL SERPL-MCNC: 81 MG/DL (ref 0–150)
TSH SERPL DL<=0.005 MIU/L-ACNC: 1.35 UIU/ML (ref 0.27–4.2)
VLDLC SERPL CALC-MCNC: 16 MG/DL
WBC # BLD AUTO: 4.6 K/UL (ref 4–11)

## 2024-05-22 PROCEDURE — 99213 OFFICE O/P EST LOW 20 MIN: CPT | Performed by: INTERNAL MEDICINE

## 2024-05-22 PROCEDURE — 1123F ACP DISCUSS/DSCN MKR DOCD: CPT | Performed by: INTERNAL MEDICINE

## 2024-05-22 SDOH — ECONOMIC STABILITY: FOOD INSECURITY: WITHIN THE PAST 12 MONTHS, YOU WORRIED THAT YOUR FOOD WOULD RUN OUT BEFORE YOU GOT MONEY TO BUY MORE.: NEVER TRUE

## 2024-05-22 SDOH — ECONOMIC STABILITY: FOOD INSECURITY: WITHIN THE PAST 12 MONTHS, THE FOOD YOU BOUGHT JUST DIDN'T LAST AND YOU DIDN'T HAVE MONEY TO GET MORE.: NEVER TRUE

## 2024-05-22 SDOH — ECONOMIC STABILITY: INCOME INSECURITY: HOW HARD IS IT FOR YOU TO PAY FOR THE VERY BASICS LIKE FOOD, HOUSING, MEDICAL CARE, AND HEATING?: NOT HARD AT ALL

## 2024-05-22 ASSESSMENT — PATIENT HEALTH QUESTIONNAIRE - PHQ9
SUM OF ALL RESPONSES TO PHQ QUESTIONS 1-9: 0
SUM OF ALL RESPONSES TO PHQ9 QUESTIONS 1 & 2: 0
SUM OF ALL RESPONSES TO PHQ QUESTIONS 1-9: 0
SUM OF ALL RESPONSES TO PHQ QUESTIONS 1-9: 0
2. FEELING DOWN, DEPRESSED OR HOPELESS: NOT AT ALL
SUM OF ALL RESPONSES TO PHQ QUESTIONS 1-9: 0
1. LITTLE INTEREST OR PLEASURE IN DOING THINGS: NOT AT ALL

## 2024-05-22 NOTE — PROGRESS NOTES
Humaira Ghotra (:  1945) is a 78 y.o. female,Established patient, here for evaluation of the following chief complaint(s):  Hyperlipidemia      Assessment & Plan   1. Acquired hypothyroidism  -Stable, reassess, continue levothyroxine 75 mcg daily  -     Comprehensive Metabolic Panel; Future  -     Lipid Panel; Future  -     CBC with Auto Differential; Future  -     TSH with Reflex; Future  -     Vitamin D 25 Hydroxy; Future  2. Vitamin D deficiency  -     Comprehensive Metabolic Panel; Future  -     Lipid Panel; Future  -     CBC with Auto Differential; Future  -     TSH with Reflex; Future  -     Vitamin D 25 Hydroxy; Future  3. Pure hypercholesterolemia  -     Comprehensive Metabolic Panel; Future  -     Lipid Panel; Future  -     CBC with Auto Differential; Future  -     TSH with Reflex; Future  -     Vitamin D 25 Hydroxy; Future      Return for establish care later this year.       Subjective   HPI    Overall she has been doing well.  Taking her thyroid medication every day, no weight gain or fatigue.  She has some more aches and pains in the joints, started taking turmeric.  She did bang her right fifth toe, she does not think she broke it but bothering her a bit more.  She think she may have sprained it.    Review of Systems       Objective   Physical Exam  Vitals reviewed.   Constitutional:       General: She is not in acute distress.     Appearance: Normal appearance. She is well-developed.   HENT:      Head: Normocephalic and atraumatic.   Cardiovascular:      Rate and Rhythm: Normal rate and regular rhythm.      Heart sounds: Normal heart sounds.   Pulmonary:      Effort: Pulmonary effort is normal. No respiratory distress.      Breath sounds: Normal breath sounds.   Skin:     General: Skin is warm and dry.   Neurological:      Mental Status: She is alert.   Psychiatric:         Behavior: Behavior normal.         Thought Content: Thought content normal.         Judgment: Judgment normal.

## 2024-06-05 RX ORDER — LEVOTHYROXINE SODIUM 0.07 MG/1
75 TABLET ORAL DAILY
Qty: 90 TABLET | Refills: 3 | Status: SHIPPED | OUTPATIENT
Start: 2024-06-05

## 2024-07-03 ENCOUNTER — OFFICE VISIT (OUTPATIENT)
Dept: INTERNAL MEDICINE CLINIC | Age: 79
End: 2024-07-03
Payer: MEDICARE

## 2024-07-03 VITALS
BODY MASS INDEX: 21.75 KG/M2 | DIASTOLIC BLOOD PRESSURE: 70 MMHG | WEIGHT: 115.2 LBS | OXYGEN SATURATION: 100 % | SYSTOLIC BLOOD PRESSURE: 120 MMHG | HEIGHT: 61 IN | HEART RATE: 51 BPM | TEMPERATURE: 96.9 F

## 2024-07-03 DIAGNOSIS — K62.5 RECTAL BLEEDING: ICD-10-CM

## 2024-07-03 DIAGNOSIS — L98.9 SKIN LESION: Primary | ICD-10-CM

## 2024-07-03 DIAGNOSIS — E03.9 ACQUIRED HYPOTHYROIDISM: ICD-10-CM

## 2024-07-03 PROCEDURE — 99214 OFFICE O/P EST MOD 30 MIN: CPT | Performed by: STUDENT IN AN ORGANIZED HEALTH CARE EDUCATION/TRAINING PROGRAM

## 2024-07-03 PROCEDURE — 1123F ACP DISCUSS/DSCN MKR DOCD: CPT | Performed by: STUDENT IN AN ORGANIZED HEALTH CARE EDUCATION/TRAINING PROGRAM

## 2024-07-03 NOTE — PATIENT INSTRUCTIONS
[facility]  [address], [place] [address]  Telephone: [phone]  Fax: [phone]  Email: [email or web address]

## 2024-07-03 NOTE — ASSESSMENT & PLAN NOTE
This problem is stable on current regimen.  - Continue Synthroid  - Will check TSH at next visit with routine labs

## 2024-07-03 NOTE — ASSESSMENT & PLAN NOTE
Patient complains of very minor rectal bleeding, only noted when wiping with toilet paper.  She notes this when she has long days on her feet.  She has also been feeling like \"something is falling out\" of her rectum.  She has a history of uterine prolapse status post hysterectomy.  - Referred to colorectal surgery

## 2024-07-03 NOTE — ASSESSMENT & PLAN NOTE
Patient has an itchy skin lesion on her back.  She would like it removed because it is bothersome.  No bleeding noted.  Given that she has scratched it so much, it is difficult to identify but appears most consistent with irritated seborrheic keratosis.  - Referred to dermatology

## 2024-07-03 NOTE — PROGRESS NOTES
7/3/2024    Humaira Ghotra (:  1945) is a 78 y.o. female, here for evaluation of the following medical concerns:    Chief Complaint   Patient presents with    Establish Care     Previous dr. Barton     Mole     On back, itchy at times         HPI    Patient is here to establish care. Prior patient of Dr. Barton.     She has an itchy mole on her back.  It has been bothering her and she would like it removed.    She just noticed rectal bleeding a few weeks ago and recently she feels some 'prolapse.' She only noticed blood with wiping, mainly when she spends long days on her feet.      Prior to Visit Medications    Medication Sig Taking? Authorizing Provider   Nutritional Supplements (VITAMIN D BOOSTER PO) Take by mouth daily Yes Michael Bermudez MD   levothyroxine (SYNTHROID) 75 MCG tablet TAKE 1 TABLET BY MOUTH EVERY DAY Yes Odalys Barton MD   TURMERIC PO Take by mouth daily Yes Michael Bermudez MD   Calcium-Magnesium-Vitamin D (CALCIUM MAGNESIUM PO) Take by mouth in the morning and at bedtime Yes Michael Bermudez MD   zinc 50 MG TABS tablet Take 1 tablet by mouth daily Yes Michael Bermudez MD   Biotin 1000 MCG TABS Take by mouth every other day Yes Michael Bermudez MD        No Known Allergies    Past Medical History:   Diagnosis Date    Hypothyroidism        Past Surgical History:   Procedure Laterality Date    DILATION AND CURETTAGE OF UTERUS      HYSTERECTOMY (CERVIX STATUS UNKNOWN)      TONSILLECTOMY         Social History     Socioeconomic History    Marital status:      Spouse name: Not on file    Number of children: Not on file    Years of education: Not on file    Highest education level: Not on file   Occupational History    Not on file   Tobacco Use    Smoking status: Never    Smokeless tobacco: Never   Substance and Sexual Activity    Alcohol use: Yes    Drug use: No    Sexual activity: Not on file   Other Topics Concern    Not on file   Social History Narrative

## 2024-07-16 ENCOUNTER — OFFICE VISIT (OUTPATIENT)
Dept: SURGERY | Age: 79
End: 2024-07-16
Payer: MEDICARE

## 2024-07-16 VITALS
BODY MASS INDEX: 21.9 KG/M2 | HEART RATE: 68 BPM | DIASTOLIC BLOOD PRESSURE: 75 MMHG | WEIGHT: 116 LBS | TEMPERATURE: 98 F | HEIGHT: 61 IN | SYSTOLIC BLOOD PRESSURE: 148 MMHG

## 2024-07-16 DIAGNOSIS — K64.2 GRADE III HEMORRHOIDS: Primary | ICD-10-CM

## 2024-07-16 PROCEDURE — 1123F ACP DISCUSS/DSCN MKR DOCD: CPT | Performed by: SURGERY

## 2024-07-16 PROCEDURE — 99204 OFFICE O/P NEW MOD 45 MIN: CPT | Performed by: SURGERY

## 2024-07-16 NOTE — PROGRESS NOTES
pt  Discussion/coordination of care: none    Assessment/Plan:     A/P:  New undiagnosed problem(s) with uncertain prognosis: Hemorrhoids: Moderately inflamed internal hemorrhoids  Established problem(s): constipatoin  Additional workup/treatment planned and options discussed:   1) Lifestyle and stool regimen: Fiber supplementation, sitz baths, improving dietary and lifestyle choices  2) Procedural: Rubber band ligation  3) Hemorrhoidectomy - if fails less invasive meaures  Risk of complications/morbidity: moderate  Risk factors complicating treatment: constipation    Patient has signs, symptoms, and exam consistent with moderately inflamed internal hemorrhoids.      We discussed the pathophysiology, etiology, natural history, workup, and treatment options, including procedural and surgical risks for hemorrhoids.    We will start with conservative management, including OTC or prescription fiber supplementation, water, healthy fruits and vegetables, and medical management. We discussed avoiding straining, constipation, and diarrhea. Sitz baths discussed.    If symptoms do not improve in a reasonable amount of time or patient is already having regular soft stool with conservative management, patient may require more invasive treatment options, such as in-office rubber band ligation.  We discussed the rubber band ligation procedure, including prep, expectations, potential complications, postoperative discomfort.  We discussed that this would be without sedation or anesthesia, and would likely result in temporary mild pressure discomfort.  We discussed functional expectations, and risks of recurrence of hemorrhoids in the future.  We discussed potential need for multiple banding sessions.    Hemorrhoidectomy was discussed as an option, but more likely to be employed if the patient fails conservative and procedural methods as mentioned above.  Discussed that hemorrhoidectomy can be more definitive, but has significant

## 2024-07-16 NOTE — PATIENT INSTRUCTIONS
experience sharp or severe pain that doesn't subside within 1-2 hours  You have excessive bleeding, fever, chills, or inability to urinate  You need to reschedule or have changed your mind about having the procedure.  Dr Hoffmann's phone # is [phone]  If you are unable to reach the office (outside of normal business hours) and you have any concerns, go to your nearest emergency room.

## 2024-07-31 ENCOUNTER — OFFICE VISIT (OUTPATIENT)
Dept: SURGERY | Age: 79
End: 2024-07-31
Payer: MEDICARE

## 2024-07-31 VITALS
HEART RATE: 66 BPM | TEMPERATURE: 97.8 F | BODY MASS INDEX: 21.35 KG/M2 | DIASTOLIC BLOOD PRESSURE: 73 MMHG | HEIGHT: 62 IN | WEIGHT: 116 LBS | OXYGEN SATURATION: 95 % | SYSTOLIC BLOOD PRESSURE: 151 MMHG

## 2024-07-31 DIAGNOSIS — K64.2 GRADE III HEMORRHOIDS: Primary | ICD-10-CM

## 2024-07-31 PROCEDURE — 46221 LIGATION OF HEMORRHOID(S): CPT | Performed by: SURGERY

## 2024-07-31 NOTE — PATIENT INSTRUCTIONS
DISCHARGE INSTRUCTIONS:     Rubber Band Ligation for Hemorrhoids: Before, During, and After Your Procedure    What is rubber band ligation?        Rubber band ligation treats hemorrhoids. Hemorrhoids are swollen veins in the rectal area that can commonly cause bleeding, excess tissue (prolapse), discomfort, and difficulty keeping clean. This treatment is only for internal hemorrhoids.    To do the procedure, your doctor puts a special viewing tool into your anus. This tool is called an anoscope. The doctor then uses a suction tool to grab the hemorrhoid and put a rubber band around it. The band stops the blood flow. This causes the hemorrhoids to shrink and fall off in 2 to 10 days, and purposeful scarring of the tissue back into the rectum.    This procedure is done in the office. Typically one hemorrhoid is treated at a time during your first visit. More can be treated if a repeat procedure is required. The procedure takes about 10-15 minutes. You can go home when it's done. Some people are able to return to regular activities right away.    It's very important not to strain when you have a bowel movement before and after your procedure. Continue with your daily fiber supplement (metamucil, benefiber, konsyl, generic brand), healthy fruits and vegetables, plenty of water (4-6 glasses per day), and MiraLax as needed. Stools should be soft and easy to pass, but not diarrhea.    Preparing for the procedure  Understand exactly what procedure is planned, along with the risks, benefits, and other options.  If you take blood thinners, such as warfarin (Coumadin), clopidogrel (Plavix), or aspirin, be sure to talk to your doctor. He or she will tell you if you should stop taking these medicines before your procedure. Make sure that you understand exactly what your doctor wants you to do.  Please perform a fleets enema 1-2 hours before your appointment. This will insure the rectum is cleaned out. This can be provided from

## 2024-07-31 NOTE — PROGRESS NOTES
INTERNAL HEMORRHOID RUBBER BAND LIGATION PROCEDURE NOTE:    Patient presented with symptomatic internal hemorrhoids unresponsive to conservative management. See previous office notes for details of previous history and treatments.     Risks of rubber band ligation explained to patient, including but not limited to: immediate and delayed bleeding, pain, infection, external hemorrhoids thrombosis, pelvic sepsis, urinary retention, sphincter dysfunction, need for additional procedures, and recurrence. Patient was previously provided with information in office AVS (after visit summary) and given opportunity to ask any questions and bring up any concerns.     Chaperone/MA present in room during entire procedure.    Patient was placed in either lateral or knee-chest positioning depending on patient preference. Exam table manipulated for proper visualization and lighting. Buttocks spread. Digital exam and anoscopy performed confirming internal hemorrhoids.    Suction rubber band ligator used to place band in 2 positions, 1 cm proximal to the dentate line, at the apex of the internal hemorrhoid.    Anoscope removed. Patient tolerated the procedure well without complication. EBL minimal. Post procedure expectations and instructions explained to patient. Written instructions provided as well.    Disposition: Follow up in 4 weeks for symptom reassessment.    Electronically signed by Christian Hoffmann MD on 7/31/2024 at 1:14 PM

## 2025-03-17 ENCOUNTER — TELEPHONE (OUTPATIENT)
Dept: INTERNAL MEDICINE CLINIC | Age: 80
End: 2025-03-17

## 2025-03-17 NOTE — TELEPHONE ENCOUNTER
Pt had the flu last week was taking mucinex she bought some cough syrup and she was wonder which one to take   Call back [phone]

## 2025-03-17 NOTE — TELEPHONE ENCOUNTER
Mucinex DM the DM stands for dextromethorphan.  Delsym is dextromethorphan.  Therefore, she should not take both of these because they both have the same active ingredient of dextromethorphan.  However Mucinex has an extra ingredient of guaifenesin, which is an expectorant-this means it helps thin out the secretions in her chest and helps her cough up mucus that she may be having trouble getting out of her chest.    Therefore, if she is cough and feels like she is having trouble coughing up mucus that feels stuck in her chest or feels very thick, the Mucinex DM is a good idea.  If she is not having a productive cough, and just has a dry cough then the Delsym is fine instead.  But it is 1 med or the other.

## 2025-04-18 ENCOUNTER — OFFICE VISIT (OUTPATIENT)
Dept: INTERNAL MEDICINE CLINIC | Age: 80
End: 2025-04-18

## 2025-04-18 VITALS
DIASTOLIC BLOOD PRESSURE: 80 MMHG | HEART RATE: 74 BPM | BODY MASS INDEX: 21.03 KG/M2 | SYSTOLIC BLOOD PRESSURE: 134 MMHG | OXYGEN SATURATION: 98 % | WEIGHT: 111.4 LBS | TEMPERATURE: 97.1 F | HEIGHT: 61 IN

## 2025-04-18 DIAGNOSIS — Z12.12 ENCOUNTER FOR COLORECTAL CANCER SCREENING: ICD-10-CM

## 2025-04-18 DIAGNOSIS — R73.01 IMPAIRED FASTING GLUCOSE: ICD-10-CM

## 2025-04-18 DIAGNOSIS — Z78.0 POST-MENOPAUSAL: ICD-10-CM

## 2025-04-18 DIAGNOSIS — R53.83 OTHER FATIGUE: ICD-10-CM

## 2025-04-18 DIAGNOSIS — E55.9 VITAMIN D DEFICIENCY: ICD-10-CM

## 2025-04-18 DIAGNOSIS — Z12.11 ENCOUNTER FOR COLORECTAL CANCER SCREENING: ICD-10-CM

## 2025-04-18 DIAGNOSIS — Z12.31 ENCOUNTER FOR SCREENING MAMMOGRAM FOR MALIGNANT NEOPLASM OF BREAST: ICD-10-CM

## 2025-04-18 DIAGNOSIS — E78.00 PURE HYPERCHOLESTEROLEMIA: ICD-10-CM

## 2025-04-18 DIAGNOSIS — B35.1 ONYCHOMYCOSIS: ICD-10-CM

## 2025-04-18 DIAGNOSIS — E03.9 ACQUIRED HYPOTHYROIDISM: ICD-10-CM

## 2025-04-18 DIAGNOSIS — Z00.00 MEDICARE ANNUAL WELLNESS VISIT, SUBSEQUENT: Primary | ICD-10-CM

## 2025-04-18 LAB
25(OH)D3 SERPL-MCNC: 34.6 NG/ML
ALBUMIN SERPL-MCNC: 4.5 G/DL (ref 3.4–5)
ALP SERPL-CCNC: 59 U/L (ref 40–129)
ALT SERPL-CCNC: 19 U/L (ref 10–40)
ANION GAP SERPL CALCULATED.3IONS-SCNC: 8 MMOL/L (ref 3–16)
AST SERPL-CCNC: 23 U/L (ref 15–37)
BACTERIA URNS QL MICRO: ABNORMAL /HPF
BASOPHILS # BLD: 0 K/UL (ref 0–0.2)
BASOPHILS NFR BLD: 1.2 %
BILIRUB DIRECT SERPL-MCNC: 0.2 MG/DL (ref 0–0.3)
BILIRUB INDIRECT SERPL-MCNC: 0.4 MG/DL (ref 0–1)
BILIRUB SERPL-MCNC: 0.6 MG/DL (ref 0–1)
BILIRUB UR QL STRIP.AUTO: NEGATIVE
BUN SERPL-MCNC: 11 MG/DL (ref 7–20)
CALCIUM SERPL-MCNC: 9.5 MG/DL (ref 8.3–10.6)
CHLORIDE SERPL-SCNC: 103 MMOL/L (ref 99–110)
CHOLEST SERPL-MCNC: 216 MG/DL (ref 0–199)
CLARITY UR: CLEAR
CO2 SERPL-SCNC: 27 MMOL/L (ref 21–32)
COLOR UR: YELLOW
CREAT SERPL-MCNC: 0.6 MG/DL (ref 0.6–1.2)
DEPRECATED RDW RBC AUTO: 13.8 % (ref 12.4–15.4)
EOSINOPHIL # BLD: 0.1 K/UL (ref 0–0.6)
EOSINOPHIL NFR BLD: 2 %
EPI CELLS #/AREA URNS AUTO: 0 /HPF (ref 0–5)
EST. AVERAGE GLUCOSE BLD GHB EST-MCNC: 105.4 MG/DL
GFR SERPLBLD CREATININE-BSD FMLA CKD-EPI: >90 ML/MIN/{1.73_M2}
GLUCOSE SERPL-MCNC: 97 MG/DL (ref 70–99)
GLUCOSE UR STRIP.AUTO-MCNC: NEGATIVE MG/DL
HBA1C MFR BLD: 5.3 %
HCT VFR BLD AUTO: 41.4 % (ref 36–48)
HDLC SERPL-MCNC: 87 MG/DL (ref 40–60)
HGB BLD-MCNC: 14.4 G/DL (ref 12–16)
HGB UR QL STRIP.AUTO: NEGATIVE
HYALINE CASTS #/AREA URNS AUTO: 0 /LPF (ref 0–8)
KETONES UR STRIP.AUTO-MCNC: NEGATIVE MG/DL
LDLC SERPL CALC-MCNC: 118 MG/DL
LEUKOCYTE ESTERASE UR QL STRIP.AUTO: ABNORMAL
LYMPHOCYTES # BLD: 1.4 K/UL (ref 1–5.1)
LYMPHOCYTES NFR BLD: 34.8 %
MCH RBC QN AUTO: 33.5 PG (ref 26–34)
MCHC RBC AUTO-ENTMCNC: 34.7 G/DL (ref 31–36)
MCV RBC AUTO: 96.6 FL (ref 80–100)
MONOCYTES # BLD: 0.3 K/UL (ref 0–1.3)
MONOCYTES NFR BLD: 8.4 %
NEUTROPHILS # BLD: 2.2 K/UL (ref 1.7–7.7)
NEUTROPHILS NFR BLD: 53.6 %
NITRITE UR QL STRIP.AUTO: NEGATIVE
PH UR STRIP.AUTO: 7 [PH] (ref 5–8)
PLATELET # BLD AUTO: 338 K/UL (ref 135–450)
PMV BLD AUTO: 8.1 FL (ref 5–10.5)
POTASSIUM SERPL-SCNC: 5.1 MMOL/L (ref 3.5–5.1)
PROT SERPL-MCNC: 7.1 G/DL (ref 6.4–8.2)
PROT UR STRIP.AUTO-MCNC: NEGATIVE MG/DL
RBC # BLD AUTO: 4.29 M/UL (ref 4–5.2)
RBC CLUMPS #/AREA URNS AUTO: 1 /HPF (ref 0–4)
SODIUM SERPL-SCNC: 138 MMOL/L (ref 136–145)
SP GR UR STRIP.AUTO: 1.01 (ref 1–1.03)
TRIGL SERPL-MCNC: 57 MG/DL (ref 0–150)
TSH SERPL DL<=0.005 MIU/L-ACNC: 0.72 UIU/ML (ref 0.27–4.2)
UA DIPSTICK W REFLEX MICRO PNL UR: YES
URN SPEC COLLECT METH UR: ABNORMAL
UROBILINOGEN UR STRIP-ACNC: 0.2 E.U./DL
VLDLC SERPL CALC-MCNC: 11 MG/DL
WBC # BLD AUTO: 4.1 K/UL (ref 4–11)
WBC #/AREA URNS AUTO: 3 /HPF (ref 0–5)

## 2025-04-18 RX ORDER — TERBINAFINE HYDROCHLORIDE 250 MG/1
250 TABLET ORAL DAILY
Qty: 90 TABLET | Refills: 0 | Status: SHIPPED | OUTPATIENT
Start: 2025-04-18 | End: 2025-07-17

## 2025-04-18 SDOH — ECONOMIC STABILITY: FOOD INSECURITY: WITHIN THE PAST 12 MONTHS, THE FOOD YOU BOUGHT JUST DIDN'T LAST AND YOU DIDN'T HAVE MONEY TO GET MORE.: NEVER TRUE

## 2025-04-18 SDOH — ECONOMIC STABILITY: FOOD INSECURITY: WITHIN THE PAST 12 MONTHS, YOU WORRIED THAT YOUR FOOD WOULD RUN OUT BEFORE YOU GOT MONEY TO BUY MORE.: NEVER TRUE

## 2025-04-18 ASSESSMENT — PATIENT HEALTH QUESTIONNAIRE - PHQ9
1. LITTLE INTEREST OR PLEASURE IN DOING THINGS: NOT AT ALL
SUM OF ALL RESPONSES TO PHQ QUESTIONS 1-9: 0
2. FEELING DOWN, DEPRESSED OR HOPELESS: NOT AT ALL

## 2025-04-18 ASSESSMENT — LIFESTYLE VARIABLES
HOW MANY STANDARD DRINKS CONTAINING ALCOHOL DO YOU HAVE ON A TYPICAL DAY: 1 OR 2
HOW OFTEN DO YOU HAVE A DRINK CONTAINING ALCOHOL: 2-3 TIMES A WEEK

## 2025-04-18 NOTE — PROGRESS NOTES
sounds: Normal breath sounds.   Abdominal:      General: There is no distension.      Palpations: Abdomen is soft.      Tenderness: There is no abdominal tenderness.   Musculoskeletal:         General: No swelling or tenderness.      Cervical back: Neck supple. No tenderness.   Lymphadenopathy:      Cervical: No cervical adenopathy.   Skin:     General: Skin is warm and dry.   Neurological:      Mental Status: She is oriented to person, place, and time. Mental status is at baseline.      Motor: No weakness.   Psychiatric:         Mood and Affect: Mood normal.               No Known Allergies  Prior to Visit Medications    Medication Sig Taking? Authorizing Provider   terbinafine (LAMISIL) 250 MG tablet Take 1 tablet by mouth daily For 12 weeks Yes Neema Hogan MD   Nutritional Supplements (VITAMIN D BOOSTER PO) Take by mouth daily Yes Michael Bermudez MD   levothyroxine (SYNTHROID) 75 MCG tablet TAKE 1 TABLET BY MOUTH EVERY DAY Yes Odalys Barton MD   TURMERIC PO Take by mouth daily Yes Michael Bermudez MD   Calcium-Magnesium-Vitamin D (CALCIUM MAGNESIUM PO) Take by mouth in the morning and at bedtime Yes Michael Bermudez MD   zinc 50 MG TABS tablet Take 1 tablet by mouth daily Yes Michael Bermudez MD   Biotin 1000 MCG TABS Take by mouth every other day Yes Michael Bermudez MD       CareTeam (Including outside providers/suppliers regularly involved in providing care):   Patient Care Team:  Neema Hogan MD as PCP - General (Internal Medicine)  Neema Hogan MD as PCP - Empaneled Provider  Rocael Gauthier Jean, MD (Ophthalmology)     Recommendations for Preventive Services Due: see orders and patient instructions/AVS.  Recommended screening schedule for the next 5-10 years is provided to the patient in written form: see Patient Instructions/AVS.     Reviewed and updated this visit:  Tobacco  Allergies  Meds  Sexual Hx

## 2025-04-18 NOTE — ASSESSMENT & PLAN NOTE
- ordered routine labs  - mammogram overdue- will order  - cologuard overdue- ordered  - eye dr and dentist appts UTD  - DEXA overdue - declines, accepts risks associated with this decision  - discussed healthy diet and exercise  - discussed outstanding immunizations

## 2025-04-18 NOTE — PATIENT INSTRUCTIONS
9/19/2023         RE: Jenise Smith  [address] [facility] [address]        Dear Colleague,    Thank you for referring your patient, Jenise Smith, to the [facility]. Please see a copy of my visit note below.    Diabetes Self-Management Education & Support    Presents for: Insulin Pump and CGM Review    Type of Service: Telephone Visit    Originating Location (Patient Location): Home  Distant Location (Provider Location): [facility]  Mode of Communication:  Telephone    Telephone Visit Start Time:  11:10a  Telephone Visit End Time (telephone visit stop time): 11:45a    How would patient like to obtain AVS? MyChart    Assessment Type:   REPORTS:                Insulin Pump Information  Insulin Pump Brand: OmniPod    Education specific to insulin pump provided today on:   importance of bolusing before meals, exercise recommendations, and Omnipod updates     Opportunities for ongoing education and support in diabetes-self management were discussed.    Pt verbalized understanding of concepts discussed and recommendations provided today.       Continue education with the following diabetes management concepts: Healthy Coping    ASSESSMENT    Jenise comes in for a check in - to get any new tips or tricks to help manager her diabetes. Generally meeting TIR targets, excellent blood sugar control. We reviewed use of exercise mode, upcoming tech, integration of iPhone, Dexcom G7. We reviewed new pump options in the future, use of local support group, general info about diabetes management. Jenise notices higher blood sugars around the time to switch her pods - we discussed potential need to switch pod every 2.5 days due to tissue fatigue. She will try this and see if she notices a difference.     Glucose Patterns & Trends:  No clear patterns identified    Patient would benefit from entering activitiy/exercise mode 30-60 minutes prior to exercise  Tdap  Shingles  Pneumonia     Hearing Loss: Care Instructions  Overview     Hearing loss is a sudden or slow decrease in how well you hear. It can range from slight to profound. Permanent hearing loss can occur with aging. It also can happen when you are exposed long-term to loud noise. Examples include listening to loud music, riding motorcycles, or being around other loud machines.  Hearing loss can affect your work and home life. It can make you feel lonely or depressed. You may feel that you have lost your independence. But hearing aids and other devices can help you hear better and feel connected to others.  Follow-up care is a key part of your treatment and safety. Be sure to make and go to all appointments, and call your doctor if you are having problems. It's also a good idea to know your test results and keep a list of the medicines you take.  How can you care for yourself at home?  Avoid loud noises whenever possible. This helps keep your hearing from getting worse.  Always wear hearing protection around loud noises.  Wear a hearing aid as directed.  A professional can help you pick a hearing aid that will work best for you.  You can also get hearing aids over the counter for mild to moderate hearing loss.  Have hearing tests as your doctor suggests. They can show whether your hearing has changed. Your hearing aid may need to be adjusted.  Use other devices as needed. These may include:  Telephone amplifiers and hearing aids that can connect to a television, stereo, radio, or microphone.  Devices that use lights or vibrations. These alert you to the doorbell, a ringing telephone, or a baby monitor.  Television closed-captioning. This shows the words at the bottom of the screen. Most new TVs can do this.  TTY (text telephone). This lets you type messages back and forth on the telephone instead of talking or listening. These devices are also called TDD. When messages are typed on the keyboard, they are sent  "and continuing 60 minutes after exercise.    Changes made to pump settings:  none    Changes made to sensor settings:   none    PLAN    Could consider changing pod every 2.5 days - may need updated prescription if she finds this more effective.   No changes to treatment plan today     Topics to cover at upcoming visits: Healthy Coping    Follow-up: 12/19/23    See Care Plan for co-developed, patient-state behavior change goals.  AVS provided for patient today.    Education Materials Provided:  No new materials provided today      SUBJECTIVE/OBJECTIVE:  Presents for: Insulin Pump and CGM Review  Accompanied by: Self  Diabetes education in the past 24mo: Yes  Focus of Visit: Insulin Pump, CGM  Type of Pump visit: Pump Review  Type of CGM visit: Personal CGM Follow-up  Diabetes type: Type 1  Disease course: Stable  How confident are you filling out medical forms by yourself:: Extremely  Diabetes management related comments/concerns: Just want to know what's new out there, any tips or tricks to use  Transportation concerns: No  Difficulty affording diabetes medication?: No  Difficulty affording diabetes testing supplies?: No  Other concerns:: None  Cultural Influences/Ethnic Background:  Choose not to answer      Diabetes Symptoms & Complications:     Complications assessed today?: No    Patient Problem List and Family Medical History reviewed for relevant medical history, current medical status, and diabetes risk factors.    Vitals:  There were no vitals taken for this visit.  Estimated body mass index is 23.96 kg/m  as calculated from the following:    Height as of 4/12/23: 1.702 m (5' 7.01\").    Weight as of 4/12/23: 69.4 kg (153 lb).   Last 3 BP:   BP Readings from Last 3 Encounters:   04/12/23 108/69   11/10/22 118/70   11/08/22 108/62       History   Smoking Status     Never   Smokeless Tobacco     Never       Labs:  Lab Results   Component Value Date    A1C 6.2 06/02/2023    A1C 6.1 05/17/2021     Lab Results "   Component Value Date     06/02/2023     09/23/2022     05/17/2021     Lab Results   Component Value Date    LDL 52 03/01/2022    LDL 60 02/02/2021     HDL Cholesterol   Date Value Ref Range Status   02/02/2021 73 >49 mg/dL Final     Direct Measure HDL   Date Value Ref Range Status   03/01/2022 96 >=50 mg/dL Final   ]  GFR Estimate   Date Value Ref Range Status   06/02/2023 83 >60 mL/min/1.73m2 Final     Comment:     eGFR calculated using 2021 CKD-EPI equation.   07/07/2021 60 (L) >60 mL/min/[1.73_m2] Final     GFR Estimate If Black   Date Value Ref Range Status   07/07/2021 73 >60 mL/min/[1.73_m2] Final     Lab Results   Component Value Date    CR 0.87 06/02/2023    CR 1.03 05/17/2021     No results found for: MICROALBUMIN    Healthy Eating:  Healthy Eating Assessed Today: Yes  Other: Working with  to help lose weight - down 15 lbs, has decreased red wine consumption, trying to make healthier choices.    Being Active:  Being Active Assessed Today: Yes  Exercise:: Yes    Monitoring:  Monitoring Assessed Today: Yes  Did patient bring glucose meter to appointment? : Yes  Blood Glucose Meter: CGM  Times checking blood sugar at home (number): 5+  Times checking blood sugar at home (per): Day  Blood glucose trend: Decreasing    See above.     Taking Medications:  Diabetes Medication(s)       Diabetic Other       Glucagon (BAQSIMI TWO PACK) 3 MG/DOSE POWD    Spray 1 spray (3 mg) in nostril once as needed (severe hypoglycemia reaction, dose as directed)      Insulin       HUMALOG 100 UNIT/ML injection         insulin glargine (LANTUS SOLOSTAR) 100 UNIT/ML pen    INJECT SUBCUTANEOUSLY 16  UNITS DAILY AS DIRECTED     insulin lispro (HUMALOG KWIKPEN) 100 UNIT/ML (1 unit dial) KWIKPEN    INJECT 3 TO 8 UNITS  SUBCUTANEOUSLY WITH MEALS AND  CORRECTION DOSES AS DIRECTED,  TOTAL DAILY DOSE APPROXIMATELY  30 UNITS IF THE PUMP FAILS     insulin lispro (HUMALOG VIAL) 100 UNIT/ML vial    Use with  insulin pump, total daily dose approx 70 units            Taking Medication Assessed Today: Yes  Current Treatments: Insulin Pump  Problems taking diabetes medications regularly?: No  Diabetes medication side effects?: No    Problem Solving:  Problem Solving Assessed Today: No  Is the patient at risk for hypoglycemia?: Yes  Hypoglycemia Frequency: Weekly              Reducing Risks:  Reducing Risks Assessed Today: No    Healthy Coping:  Healthy Coping Assessed Today: Yes  Emotional response to diabetes: Ready to learn, Confidence diabetes can be controlled  Informal Support system:: Family, Friends  Stage of change: MAINTENANCE (Working to maintain change, with risk of relapse)  Support resources: Offerings in Clinic Communities, Support Groups  Patient Activation Measure Survey Score:      7/21/2014     4:00 PM   JOJO Score (Last Two)   JOJO Raw Score 52   Activation Score 100   JOJO Level 4         Care Plan and Education Provided:  Care Plan: Diabetes   Updates made by Amy Mcguire RD since 9/21/2023 12:00 AM        Problem: HbA1C Not In Goal         Goal: Establish Regular Follow-Ups with PCP         Task: Discuss with PCP the recommended timing for patient's next follow up visit(s)    Responsible User: Amy Mcguire RD        Task: Discuss schedule for PCP visits with patient    Responsible User: Amy Mcguire RD        Goal: Get HbA1C Level in Goal         Task: Educate patient on diabetes education self-management topics    Responsible User: Amy Mcguire RD        Task: Educate patient on benefits of regular glucose monitoring    Responsible User: Amy Mcguire RD        Task: Refer patient to appropriate extended care team member, as needed (Medication Therapy Management, Behavioral Health, Physical Therapy, etc.)    Responsible User: Amy Mcguire RD        Task: Discuss diabetes treatment plan with patient    Responsible User: Amy Mcguire RD        Problem: Diabetes Self-Management Education Needed  to Optimize Self-Care Behaviors         Goal: Understand diabetes pathophysiology and disease progression         Task: Provide education on diabetes pathophysiology and disease progression specfic to patient's diabetes type    Responsible User: Amy Mcguire RD        Goal: Healthy Eating - follow a healthy eating pattern for diabetes         Task: Provide education on portion control and consistency in amount, composition and timing of food intake    Responsible User: Amy Mcguire RD        Task: Provide education on managing carbohydrate intake (carbohydrate counting, plate planning method, etc.)    Responsible User: Amy Mcguire RD        Task: Provide education on weight management    Responsible User: Amy Mcguire RD        Task: Provide education on heart healthy eating    Responsible User: Amy Mcguire RD        Task: Provide education on eating out    Responsible User: Amy Mcguire RD        Task: Develop individualized healthy eating plan with patient    Responsible User: Amy Mcguire RD        Goal: Being Active - get regular physical activity, working up to at least 150 minutes per week         Task: Provide education on relationship of activity to glucose and precautions to take if at risk for low glucose Completed 9/21/2023   Responsible User: Amy Mcguire RD        Task: Discuss barriers to physical activity with patient Completed 9/21/2023   Responsible User: Amy Mcguire RD        Task: Develop physical activity plan with patient Completed 9/21/2023   Responsible User: Amy Mcguire RD        Task: Explore community resources including walking groups, assistance programs, and home videos    Responsible User: Amy Mcguire RD        Goal: Monitoring - monitor glucose and ketones as directed         Task: Provide education on blood glucose monitoring (purpose, proper technique, frequency, glucose targets, interpreting results, when to use glucose control solution, sharps disposal)     Responsible User: Amy Mcguire RD        Task: Provide education on continuous glucose monitoring (sensor placement, use of erinn or /reader, understanding glucose trends, alerts and alarms, differences between sensor glucose and blood glucose) Completed 9/21/2023   Responsible User: Amy Mcguire RD        Task: Provide education on ketone monitoring (when to monitor, frequency, etc.)    Responsible User: Amy Mcguire RD        Goal: Taking Medication - patient is consistently taking medications as directed         Task: Provide education on action of prescribed medication, including when to take and possible side effects Completed 9/21/2023   Responsible User: Amy Mcguire RD        Task: Provide education on insulin and injectable diabetes medications, including administration, storage, site selection and rotation for injection sites Completed 9/21/2023   Responsible User: Amy Mcguire RD        Task: Discuss barriers to medication adherence with patient and provide management technique ideas as appropriate    Responsible User: Amy Mcguire RD        Task: Provide education on frequency and refill details of medications    Responsible User: Amy Mcguire RD        Goal: Problem Solving - know how to prevent and manage short-term diabetes complications         Task: Provide education on high blood glucose - causes, signs/symptoms, prevention and treatment    Responsible User: Amy Mcguire RD        Task: Provide education on low blood glucose - causes, signs/symptoms, prevention, treatment, carrying a carbohydrate source at all times, and medical identification    Responsible User: Amy Mcguire RD        Task: Provide education on safe travel with diabetes    Responsible User: Amy Mcguire RD        Task: Provide education on how to care for diabetes on sick days    Responsible User: Amy Mcguire RD        Task: Provide education on when to call a health care provider    Responsible  User: Amy Mcguire RD        Goal: Reducing Risks - know how to prevent and treat long-term diabetes complications         Task: Provide education on major complications of diabetes, prevention, early diagnostic measures and treatment of complications    Responsible User: Amy Mcguire RD        Task: Provide education on recommended care for dental, eye and foot health    Responsible User: Amy Mcguire RD        Task: Provide education on Hemoglobin A1c - goals and relationship to blood glucose levels    Responsible User: Amy Mcguire RD        Task: Provide education on recommendations for heart health - lipid levels and goals, blood pressure and goals, and aspirin therapy, if indicated    Responsible User: Amy Mcguire RD        Task: Provide education on tobacco cessation    Responsible User: Amy Mcguire RD        Goal: Healthy Coping - use available resources to cope with the challenges of managing diabetes         Task: Discuss recognizing feelings about having diabetes    Responsible User: Amy Mcguire RD        Task: Provide education on the benefits of making appropriate lifestyle changes    Responsible User: Amy Mcguire RD        Task: Provide education on benefits of utilizing support systems    Responsible User: Amy Mcguire RD        Task: Discuss methods for coping with stress    Responsible User: Amy Mcguire RD        Task: Provide education on when to seek professional counseling    Responsible User: Amy Mcguire RD Elise Graham, RD, [place], [facility]     Time Spent: 30 minutes  Encounter Type: Individual    Any diabetes medication dose changes were made via the CDE Protocol per the patient's endocrinology provider. A copy of this encounter was shared with the provider.

## 2025-04-18 NOTE — ASSESSMENT & PLAN NOTE
This is a chronic problem.  It is very bothersome for the patient.  She is requesting treatment.  - Discussed different topical treatment options as well as terbinafine  - Patient elects terbinafine  - Discussed side effects and risks of this medication today  - Start terbinafine 250 mg daily x 12 wks  - Will get LFT baseline prior to treatment and recheck in 4 to 6 weeks on medication

## 2025-04-21 ENCOUNTER — RESULTS FOLLOW-UP (OUTPATIENT)
Dept: INTERNAL MEDICINE CLINIC | Age: 80
End: 2025-04-21

## 2025-04-28 ENCOUNTER — HOSPITAL ENCOUNTER (OUTPATIENT)
Dept: MAMMOGRAPHY | Age: 80
Discharge: HOME | End: 2025-04-28
Payer: MEDICARE

## 2025-04-28 VITALS — BODY MASS INDEX: 20.96 KG/M2 | HEIGHT: 61 IN | WEIGHT: 111 LBS

## 2025-04-28 DIAGNOSIS — Z12.31 ENCOUNTER FOR SCREENING MAMMOGRAM FOR MALIGNANT NEOPLASM OF BREAST: ICD-10-CM

## 2025-04-28 PROCEDURE — 77063 BREAST TOMOSYNTHESIS BI: CPT

## 2025-05-02 LAB — NONINV COLON CA DNA+OCC BLD SCRN STL QL: NEGATIVE

## 2025-05-22 DIAGNOSIS — B35.1 ONYCHOMYCOSIS: ICD-10-CM

## 2025-05-23 LAB
ALBUMIN SERPL-MCNC: 4.4 G/DL (ref 3.4–5)
ALP SERPL-CCNC: 58 U/L (ref 40–129)
ALT SERPL-CCNC: 22 U/L (ref 10–40)
AST SERPL-CCNC: 23 U/L (ref 15–37)
BILIRUB DIRECT SERPL-MCNC: 0.2 MG/DL (ref 0–0.3)
BILIRUB INDIRECT SERPL-MCNC: 0.1 MG/DL (ref 0–1)
BILIRUB SERPL-MCNC: 0.3 MG/DL (ref 0–1)
PROT SERPL-MCNC: 7 G/DL (ref 6.4–8.2)

## 2025-05-28 RX ORDER — LEVOTHYROXINE SODIUM 75 UG/1
75 TABLET ORAL DAILY
Qty: 90 TABLET | Refills: 3 | Status: SHIPPED | OUTPATIENT
Start: 2025-05-28

## 2025-05-28 NOTE — TELEPHONE ENCOUNTER
Pharm req med refill  levothyroxine (SYNTHROID) 75 MCG tablet [[id number]]    Pharm-[id number] - [place], [place] - [address] - P [phone] - F [phone]

## 2025-07-18 DIAGNOSIS — B35.1 ONYCHOMYCOSIS: ICD-10-CM

## 2025-07-18 RX ORDER — TERBINAFINE HYDROCHLORIDE 250 MG/1
TABLET ORAL
Qty: 90 TABLET | Refills: 1 | Status: SHIPPED | OUTPATIENT
Start: 2025-07-18

## 2025-08-22 ENCOUNTER — HOSPITAL ENCOUNTER (INPATIENT)
Age: 80
LOS: 3 days | Discharge: HOME | DRG: 482 | End: 2025-08-25
Attending: EMERGENCY MEDICINE | Admitting: HOSPITALIST
Payer: MEDICARE

## 2025-08-22 ENCOUNTER — APPOINTMENT (OUTPATIENT)
Dept: GENERAL RADIOLOGY | Age: 80
DRG: 482 | End: 2025-08-22
Payer: MEDICARE

## 2025-08-22 ENCOUNTER — APPOINTMENT (OUTPATIENT)
Dept: CT IMAGING | Age: 80
DRG: 482 | End: 2025-08-22
Payer: MEDICARE

## 2025-08-22 DIAGNOSIS — S72.002A CLOSED FRACTURE OF NECK OF LEFT FEMUR, INITIAL ENCOUNTER (HCC): ICD-10-CM
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
## 2025-08-22 LAB
ALBUMIN SERPL-MCNC: 4.4 G/DL (ref 3.4–5)
ANION GAP SERPL CALCULATED.3IONS-SCNC: 11 MMOL/L (ref 3–16)
BASOPHILS # BLD: 0 K/UL (ref 0–0.2)
BASOPHILS NFR BLD: 0.5 %
BUN SERPL-MCNC: 9 MG/DL (ref 7–20)
CALCIUM SERPL-MCNC: 9.4 MG/DL (ref 8.3–10.6)
CHLORIDE SERPL-SCNC: 99 MMOL/L (ref 99–110)
CO2 SERPL-SCNC: 23 MMOL/L (ref 21–32)
CREAT SERPL-MCNC: 0.6 MG/DL (ref 0.6–1.2)
DEPRECATED RDW RBC AUTO: 13.7 % (ref 12.4–15.4)
EOSINOPHIL # BLD: 0.1 K/UL (ref 0–0.6)
EOSINOPHIL NFR BLD: 1.1 %
GFR SERPLBLD CREATININE-BSD FMLA CKD-EPI: >90 ML/MIN/{1.73_M2}
GLUCOSE SERPL-MCNC: 92 MG/DL (ref 70–99)
HCT VFR BLD AUTO: 39.7 % (ref 36–48)
HGB BLD-MCNC: 13.7 G/DL (ref 12–16)
INR PPP: 0.91 (ref 0.86–1.14)
LYMPHOCYTES # BLD: 1.4 K/UL (ref 1–5.1)
LYMPHOCYTES NFR BLD: 17.4 %
MAGNESIUM SERPL-MCNC: 2.15 MG/DL (ref 1.8–2.4)
MCH RBC QN AUTO: 33.8 PG (ref 26–34)
MCHC RBC AUTO-ENTMCNC: 34.6 G/DL (ref 31–36)
MCV RBC AUTO: 97.7 FL (ref 80–100)
MONOCYTES # BLD: 0.5 K/UL (ref 0–1.3)
MONOCYTES NFR BLD: 6.8 %
NEUTROPHILS # BLD: 5.8 K/UL (ref 1.7–7.7)
NEUTROPHILS NFR BLD: 74.2 %
PHOSPHATE SERPL-MCNC: 3.8 MG/DL (ref 2.5–4.9)
PLATELET # BLD AUTO: 316 K/UL (ref 135–450)
PMV BLD AUTO: 7.4 FL (ref 5–10.5)
POTASSIUM SERPL-SCNC: 4 MMOL/L (ref 3.5–5.1)
PROTHROMBIN TIME: 12.6 SEC (ref 12.1–14.9)
RBC # BLD AUTO: 4.06 M/UL (ref 4–5.2)
SODIUM SERPL-SCNC: 133 MMOL/L (ref 136–145)
TROPONIN, HIGH SENSITIVITY: 8 NG/L (ref 0–14)
WBC # BLD AUTO: 7.8 K/UL (ref 4–11)

## 2025-08-22 PROCEDURE — 99222 1ST HOSP IP/OBS MODERATE 55: CPT | Performed by: HOSPITALIST

## 2025-08-22 PROCEDURE — 2580000003 HC RX 258

## 2025-08-22 PROCEDURE — 99285 EMERGENCY DEPT VISIT HI MDM: CPT

## 2025-08-22 PROCEDURE — 73552 X-RAY EXAM OF FEMUR 2/>: CPT

## 2025-08-22 PROCEDURE — 73700 CT LOWER EXTREMITY W/O DYE: CPT

## 2025-08-22 PROCEDURE — 85025 COMPLETE CBC W/AUTO DIFF WBC: CPT

## 2025-08-22 PROCEDURE — 1200000000 HC SEMI PRIVATE

## 2025-08-22 PROCEDURE — 6370000000 HC RX 637 (ALT 250 FOR IP)

## 2025-08-22 PROCEDURE — 6370000000 HC RX 637 (ALT 250 FOR IP): Performed by: HOSPITALIST

## 2025-08-22 PROCEDURE — 85610 PROTHROMBIN TIME: CPT

## 2025-08-22 PROCEDURE — 83735 ASSAY OF MAGNESIUM: CPT

## 2025-08-22 PROCEDURE — 72170 X-RAY EXAM OF PELVIS: CPT

## 2025-08-22 PROCEDURE — 36415 COLL VENOUS BLD VENIPUNCTURE: CPT

## 2025-08-22 PROCEDURE — 80069 RENAL FUNCTION PANEL: CPT

## 2025-08-22 PROCEDURE — 84484 ASSAY OF TROPONIN QUANT: CPT

## 2025-08-22 RX ORDER — ACETAMINOPHEN 500 MG
1000 TABLET ORAL EVERY 8 HOURS SCHEDULED
Status: DISCONTINUED | OUTPATIENT
Start: 2025-08-22 | End: 2025-08-25 | Stop reason: HOSPADM

## 2025-08-22 RX ORDER — MAGNESIUM SULFATE IN WATER 40 MG/ML
2000 INJECTION, SOLUTION INTRAVENOUS PRN
Status: DISCONTINUED | OUTPATIENT
Start: 2025-08-22 | End: 2025-08-25 | Stop reason: HOSPADM

## 2025-08-22 RX ORDER — LEVOTHYROXINE SODIUM 75 UG/1
75 TABLET ORAL DAILY
Status: DISCONTINUED | OUTPATIENT
Start: 2025-08-23 | End: 2025-08-25 | Stop reason: HOSPADM

## 2025-08-22 RX ORDER — HYDROMORPHONE HYDROCHLORIDE 1 MG/ML
0.25 INJECTION, SOLUTION INTRAMUSCULAR; INTRAVENOUS; SUBCUTANEOUS EVERY 6 HOURS PRN
Status: DISCONTINUED | OUTPATIENT
Start: 2025-08-22 | End: 2025-08-23 | Stop reason: SDUPTHER

## 2025-08-22 RX ORDER — ACETAMINOPHEN 325 MG/1
650 TABLET ORAL EVERY 6 HOURS PRN
Status: DISCONTINUED | OUTPATIENT
Start: 2025-08-22 | End: 2025-08-22

## 2025-08-22 RX ORDER — ACETAMINOPHEN 650 MG/1
650 SUPPOSITORY RECTAL EVERY 6 HOURS PRN
Status: DISCONTINUED | OUTPATIENT
Start: 2025-08-22 | End: 2025-08-22

## 2025-08-22 RX ORDER — POLYETHYLENE GLYCOL 3350 17 G/17G
17 POWDER, FOR SOLUTION ORAL DAILY PRN
Status: DISCONTINUED | OUTPATIENT
Start: 2025-08-22 | End: 2025-08-25 | Stop reason: HOSPADM

## 2025-08-22 RX ORDER — POTASSIUM CHLORIDE 1500 MG/1
40 TABLET, EXTENDED RELEASE ORAL PRN
Status: DISCONTINUED | OUTPATIENT
Start: 2025-08-22 | End: 2025-08-25 | Stop reason: HOSPADM

## 2025-08-22 RX ORDER — SODIUM CHLORIDE 9 MG/ML
INJECTION, SOLUTION INTRAVENOUS CONTINUOUS
Status: ACTIVE | OUTPATIENT
Start: 2025-08-22 | End: 2025-08-23

## 2025-08-22 RX ORDER — SODIUM CHLORIDE 0.9 % (FLUSH) 0.9 %
5-40 SYRINGE (ML) INJECTION EVERY 12 HOURS SCHEDULED
Status: DISCONTINUED | OUTPATIENT
Start: 2025-08-22 | End: 2025-08-25 | Stop reason: HOSPADM

## 2025-08-22 RX ORDER — METHOCARBAMOL 750 MG/1
750 TABLET, FILM COATED ORAL 3 TIMES DAILY
Status: DISCONTINUED | OUTPATIENT
Start: 2025-08-22 | End: 2025-08-25 | Stop reason: HOSPADM

## 2025-08-22 RX ORDER — LEVOTHYROXINE SODIUM 75 UG/1
75 TABLET ORAL DAILY
Status: DISCONTINUED | OUTPATIENT
Start: 2025-08-23 | End: 2025-08-22

## 2025-08-22 RX ORDER — POTASSIUM CHLORIDE 7.45 MG/ML
10 INJECTION INTRAVENOUS PRN
Status: DISCONTINUED | OUTPATIENT
Start: 2025-08-22 | End: 2025-08-25 | Stop reason: HOSPADM

## 2025-08-22 RX ORDER — SODIUM CHLORIDE 0.9 % (FLUSH) 0.9 %
5-40 SYRINGE (ML) INJECTION PRN
Status: DISCONTINUED | OUTPATIENT
Start: 2025-08-22 | End: 2025-08-25 | Stop reason: HOSPADM

## 2025-08-22 RX ORDER — ONDANSETRON 4 MG/1
4 TABLET, ORALLY DISINTEGRATING ORAL EVERY 8 HOURS PRN
Status: DISCONTINUED | OUTPATIENT
Start: 2025-08-22 | End: 2025-08-25 | Stop reason: HOSPADM

## 2025-08-22 RX ORDER — ONDANSETRON 2 MG/ML
4 INJECTION INTRAMUSCULAR; INTRAVENOUS EVERY 6 HOURS PRN
Status: DISCONTINUED | OUTPATIENT
Start: 2025-08-22 | End: 2025-08-25 | Stop reason: HOSPADM

## 2025-08-22 RX ORDER — SODIUM CHLORIDE 9 MG/ML
INJECTION, SOLUTION INTRAVENOUS PRN
Status: DISCONTINUED | OUTPATIENT
Start: 2025-08-22 | End: 2025-08-25 | Stop reason: HOSPADM

## 2025-08-22 RX ADMIN — ACETAMINOPHEN 1000 MG: 500 TABLET ORAL at 23:48

## 2025-08-22 RX ADMIN — SODIUM CHLORIDE 950 ML: 0.9 INJECTION, SOLUTION INTRAVENOUS at 23:06

## 2025-08-22 RX ADMIN — LEVOTHYROXINE SODIUM 75 MCG: 0.07 TABLET ORAL at 23:47

## 2025-08-22 ASSESSMENT — PAIN SCALES - GENERAL
PAINLEVEL_OUTOF10: 0
PAINLEVEL_OUTOF10: 6
PAINLEVEL_OUTOF10: 0

## 2025-08-22 ASSESSMENT — PAIN DESCRIPTION - LOCATION: LOCATION: LEG

## 2025-08-22 ASSESSMENT — PAIN - FUNCTIONAL ASSESSMENT
PAIN_FUNCTIONAL_ASSESSMENT: 0-10
PAIN_FUNCTIONAL_ASSESSMENT: 0-10
PAIN_FUNCTIONAL_ASSESSMENT: PREVENTS OR INTERFERES SOME ACTIVE ACTIVITIES AND ADLS

## 2025-08-22 ASSESSMENT — PAIN DESCRIPTION - PAIN TYPE: TYPE: ACUTE PAIN

## 2025-08-22 ASSESSMENT — PAIN DESCRIPTION - ORIENTATION: ORIENTATION: LEFT

## 2025-08-22 ASSESSMENT — PAIN DESCRIPTION - FREQUENCY: FREQUENCY: CONTINUOUS

## 2025-08-23 ENCOUNTER — ANESTHESIA (OUTPATIENT)
Dept: OPERATING ROOM | Age: 80
End: 2025-08-23
Payer: MEDICARE

## 2025-08-23 ENCOUNTER — APPOINTMENT (OUTPATIENT)
Dept: GENERAL RADIOLOGY | Age: 80
DRG: 482 | End: 2025-08-23
Payer: MEDICARE

## 2025-08-23 ENCOUNTER — ANESTHESIA EVENT (OUTPATIENT)
Dept: OPERATING ROOM | Age: 80
End: 2025-08-23
Payer: MEDICARE
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
## 2025-08-23 LAB
ALBUMIN SERPL-MCNC: 3.9 G/DL (ref 3.4–5)
ANION GAP SERPL CALCULATED.3IONS-SCNC: 9 MMOL/L (ref 3–16)
BASOPHILS # BLD: 0 K/UL (ref 0–0.2)
BASOPHILS NFR BLD: 0.6 %
BUN SERPL-MCNC: 8 MG/DL (ref 7–20)
CALCIUM SERPL-MCNC: 8.6 MG/DL (ref 8.3–10.6)
CHLORIDE SERPL-SCNC: 102 MMOL/L (ref 99–110)
CO2 SERPL-SCNC: 24 MMOL/L (ref 21–32)
CREAT SERPL-MCNC: 0.6 MG/DL (ref 0.6–1.2)
DEPRECATED RDW RBC AUTO: 13.9 % (ref 12.4–15.4)
DEPRECATED RDW RBC AUTO: 14 % (ref 12.4–15.4)
EOSINOPHIL # BLD: 0.1 K/UL (ref 0–0.6)
EOSINOPHIL NFR BLD: 2.1 %
GFR SERPLBLD CREATININE-BSD FMLA CKD-EPI: >90 ML/MIN/{1.73_M2}
GLUCOSE SERPL-MCNC: 101 MG/DL (ref 70–99)
HCT VFR BLD AUTO: 38.1 % (ref 36–48)
HCT VFR BLD AUTO: 39.9 % (ref 36–48)
HGB BLD-MCNC: 13.4 G/DL (ref 12–16)
HGB BLD-MCNC: 13.6 G/DL (ref 12–16)
LYMPHOCYTES # BLD: 1.1 K/UL (ref 1–5.1)
LYMPHOCYTES NFR BLD: 17.7 %
MAGNESIUM SERPL-MCNC: 2.05 MG/DL (ref 1.8–2.4)
MCH RBC QN AUTO: 33.8 PG (ref 26–34)
MCH RBC QN AUTO: 34.6 PG (ref 26–34)
MCHC RBC AUTO-ENTMCNC: 34.1 G/DL (ref 31–36)
MCHC RBC AUTO-ENTMCNC: 35.1 G/DL (ref 31–36)
MCV RBC AUTO: 98.5 FL (ref 80–100)
MCV RBC AUTO: 99 FL (ref 80–100)
MONOCYTES # BLD: 0.4 K/UL (ref 0–1.3)
MONOCYTES NFR BLD: 6.3 %
NEUTROPHILS # BLD: 4.6 K/UL (ref 1.7–7.7)
NEUTROPHILS NFR BLD: 73.3 %
PHOSPHATE SERPL-MCNC: 4.1 MG/DL (ref 2.5–4.9)
PLATELET # BLD AUTO: 279 K/UL (ref 135–450)
PLATELET # BLD AUTO: 288 K/UL (ref 135–450)
PMV BLD AUTO: 7.2 FL (ref 5–10.5)
PMV BLD AUTO: 7.3 FL (ref 5–10.5)
POTASSIUM SERPL-SCNC: 3.9 MMOL/L (ref 3.5–5.1)
RBC # BLD AUTO: 3.87 M/UL (ref 4–5.2)
RBC # BLD AUTO: 4.03 M/UL (ref 4–5.2)
SODIUM SERPL-SCNC: 135 MMOL/L (ref 136–145)
WBC # BLD AUTO: 6.3 K/UL (ref 4–11)
WBC # BLD AUTO: 7.8 K/UL (ref 4–11)

## 2025-08-23 PROCEDURE — 6360000002 HC RX W HCPCS: Performed by: ORTHOPAEDIC SURGERY

## 2025-08-23 PROCEDURE — 2500000003 HC RX 250 WO HCPCS: Performed by: ORTHOPAEDIC SURGERY

## 2025-08-23 PROCEDURE — 6360000002 HC RX W HCPCS

## 2025-08-23 PROCEDURE — 1200000000 HC SEMI PRIVATE

## 2025-08-23 PROCEDURE — 99232 SBSQ HOSP IP/OBS MODERATE 35: CPT | Performed by: HOSPITALIST

## 2025-08-23 PROCEDURE — 80069 RENAL FUNCTION PANEL: CPT

## 2025-08-23 PROCEDURE — 7100000000 HC PACU RECOVERY - FIRST 15 MIN: Performed by: ORTHOPAEDIC SURGERY

## 2025-08-23 PROCEDURE — 73502 X-RAY EXAM HIP UNI 2-3 VIEWS: CPT

## 2025-08-23 PROCEDURE — 36415 COLL VENOUS BLD VENIPUNCTURE: CPT

## 2025-08-23 PROCEDURE — 2500000003 HC RX 250 WO HCPCS

## 2025-08-23 PROCEDURE — 3700000001 HC ADD 15 MINUTES (ANESTHESIA): Performed by: ORTHOPAEDIC SURGERY

## 2025-08-23 PROCEDURE — 2709999900 HC NON-CHARGEABLE SUPPLY: Performed by: ORTHOPAEDIC SURGERY

## 2025-08-23 PROCEDURE — 0QH734Z INSERTION OF INTERNAL FIXATION DEVICE INTO LEFT UPPER FEMUR, PERCUTANEOUS APPROACH: ICD-10-PCS | Performed by: ORTHOPAEDIC SURGERY

## 2025-08-23 PROCEDURE — 3600000014 HC SURGERY LEVEL 4 ADDTL 15MIN: Performed by: ORTHOPAEDIC SURGERY

## 2025-08-23 PROCEDURE — 85025 COMPLETE CBC W/AUTO DIFF WBC: CPT

## 2025-08-23 PROCEDURE — 3600000004 HC SURGERY LEVEL 4 BASE: Performed by: ORTHOPAEDIC SURGERY

## 2025-08-23 PROCEDURE — 3700000000 HC ANESTHESIA ATTENDED CARE: Performed by: ORTHOPAEDIC SURGERY

## 2025-08-23 PROCEDURE — 6360000002 HC RX W HCPCS: Performed by: ANESTHESIOLOGY

## 2025-08-23 PROCEDURE — C1713 ANCHOR/SCREW BN/BN,TIS/BN: HCPCS | Performed by: ORTHOPAEDIC SURGERY

## 2025-08-23 PROCEDURE — 2580000003 HC RX 258

## 2025-08-23 PROCEDURE — 7100000001 HC PACU RECOVERY - ADDTL 15 MIN: Performed by: ORTHOPAEDIC SURGERY

## 2025-08-23 PROCEDURE — 99222 1ST HOSP IP/OBS MODERATE 55: CPT | Performed by: ORTHOPAEDIC SURGERY

## 2025-08-23 PROCEDURE — 6370000000 HC RX 637 (ALT 250 FOR IP)

## 2025-08-23 PROCEDURE — 85027 COMPLETE CBC AUTOMATED: CPT

## 2025-08-23 PROCEDURE — 2720000010 HC SURG SUPPLY STERILE: Performed by: ORTHOPAEDIC SURGERY

## 2025-08-23 PROCEDURE — 83735 ASSAY OF MAGNESIUM: CPT

## 2025-08-23 PROCEDURE — C1769 GUIDE WIRE: HCPCS | Performed by: ORTHOPAEDIC SURGERY

## 2025-08-23 DEVICE — SCREW BNE L85MM DIA7.3MM THRD L16MM CANC S STL SELF DRL ST: Type: IMPLANTABLE DEVICE | Site: HIP | Status: FUNCTIONAL

## 2025-08-23 DEVICE — SCREW BNE L90MM DIA7.3MM THRD L16MM CANC S STL SELF DRL ST: Type: IMPLANTABLE DEVICE | Site: HIP | Status: FUNCTIONAL

## 2025-08-23 RX ORDER — BUPIVACAINE HYDROCHLORIDE AND EPINEPHRINE 5; 5 MG/ML; UG/ML
INJECTION, SOLUTION EPIDURAL; INTRACAUDAL; PERINEURAL PRN
Status: DISCONTINUED | OUTPATIENT
Start: 2025-08-23 | End: 2025-08-23 | Stop reason: HOSPADM

## 2025-08-23 RX ORDER — CEFAZOLIN SODIUM 1 G/3ML
INJECTION, POWDER, FOR SOLUTION INTRAMUSCULAR; INTRAVENOUS
Status: DISCONTINUED | OUTPATIENT
Start: 2025-08-23 | End: 2025-08-23 | Stop reason: SDUPTHER

## 2025-08-23 RX ORDER — FENTANYL CITRATE 50 UG/ML
INJECTION, SOLUTION INTRAMUSCULAR; INTRAVENOUS
Status: DISCONTINUED | OUTPATIENT
Start: 2025-08-23 | End: 2025-08-23 | Stop reason: SDUPTHER

## 2025-08-23 RX ORDER — ONDANSETRON 2 MG/ML
INJECTION INTRAMUSCULAR; INTRAVENOUS
Status: DISCONTINUED | OUTPATIENT
Start: 2025-08-23 | End: 2025-08-23 | Stop reason: SDUPTHER

## 2025-08-23 RX ORDER — LIDOCAINE HYDROCHLORIDE 20 MG/ML
INJECTION, SOLUTION INFILTRATION; PERINEURAL
Status: DISCONTINUED | OUTPATIENT
Start: 2025-08-23 | End: 2025-08-23 | Stop reason: SDUPTHER

## 2025-08-23 RX ORDER — MEPERIDINE HYDROCHLORIDE 25 MG/ML
12.5 INJECTION INTRAMUSCULAR; INTRAVENOUS; SUBCUTANEOUS AS NEEDED
Status: DISCONTINUED | OUTPATIENT
Start: 2025-08-23 | End: 2025-08-23 | Stop reason: HOSPADM

## 2025-08-23 RX ORDER — SODIUM CHLORIDE 9 MG/ML
INJECTION, SOLUTION INTRAVENOUS PRN
Status: CANCELLED | OUTPATIENT
Start: 2025-08-23

## 2025-08-23 RX ORDER — ONDANSETRON 2 MG/ML
4 INJECTION INTRAMUSCULAR; INTRAVENOUS EVERY 6 HOURS PRN
Status: CANCELLED | OUTPATIENT
Start: 2025-08-23

## 2025-08-23 RX ORDER — GLYCOPYRROLATE 0.2 MG/ML
0.1 INJECTION INTRAMUSCULAR; INTRAVENOUS ONCE
Status: DISCONTINUED | OUTPATIENT
Start: 2025-08-23 | End: 2025-08-23

## 2025-08-23 RX ORDER — HYDROMORPHONE HYDROCHLORIDE 1 MG/ML
0.5 INJECTION, SOLUTION INTRAMUSCULAR; INTRAVENOUS; SUBCUTANEOUS EVERY 5 MIN PRN
Status: DISCONTINUED | OUTPATIENT
Start: 2025-08-23 | End: 2025-08-23 | Stop reason: HOSPADM

## 2025-08-23 RX ORDER — SODIUM CHLORIDE 0.9 % (FLUSH) 0.9 %
5-40 SYRINGE (ML) INJECTION EVERY 12 HOURS SCHEDULED
Status: DISCONTINUED | OUTPATIENT
Start: 2025-08-23 | End: 2025-08-25 | Stop reason: HOSPADM

## 2025-08-23 RX ORDER — ATROPINE SULFATE 0.1 MG/ML
1 INJECTION INTRAVENOUS ONCE
Status: CANCELLED | OUTPATIENT
Start: 2025-08-23 | End: 2025-08-23

## 2025-08-23 RX ORDER — METHOCARBAMOL 100 MG/ML
INJECTION, SOLUTION INTRAMUSCULAR; INTRAVENOUS
Status: DISCONTINUED | OUTPATIENT
Start: 2025-08-23 | End: 2025-08-23 | Stop reason: SDUPTHER

## 2025-08-23 RX ORDER — ONDANSETRON 2 MG/ML
4 INJECTION INTRAMUSCULAR; INTRAVENOUS EVERY 6 HOURS PRN
Status: DISCONTINUED | OUTPATIENT
Start: 2025-08-23 | End: 2025-08-23 | Stop reason: SDUPTHER

## 2025-08-23 RX ORDER — SODIUM CHLORIDE 9 MG/ML
INJECTION, SOLUTION INTRAVENOUS PRN
Status: DISCONTINUED | OUTPATIENT
Start: 2025-08-23 | End: 2025-08-25 | Stop reason: HOSPADM

## 2025-08-23 RX ORDER — SODIUM CHLORIDE 0.9 % (FLUSH) 0.9 %
5-40 SYRINGE (ML) INJECTION PRN
Status: CANCELLED | OUTPATIENT
Start: 2025-08-23

## 2025-08-23 RX ORDER — PROPOFOL 10 MG/ML
INJECTION, EMULSION INTRAVENOUS
Status: DISCONTINUED | OUTPATIENT
Start: 2025-08-23 | End: 2025-08-23 | Stop reason: SDUPTHER

## 2025-08-23 RX ORDER — SODIUM CHLORIDE 0.9 % (FLUSH) 0.9 %
5-40 SYRINGE (ML) INJECTION PRN
Status: DISCONTINUED | OUTPATIENT
Start: 2025-08-23 | End: 2025-08-23 | Stop reason: HOSPADM

## 2025-08-23 RX ORDER — SODIUM CHLORIDE 0.9 % (FLUSH) 0.9 %
5-40 SYRINGE (ML) INJECTION PRN
Status: DISCONTINUED | OUTPATIENT
Start: 2025-08-23 | End: 2025-08-25 | Stop reason: HOSPADM

## 2025-08-23 RX ORDER — TRAMADOL HYDROCHLORIDE 50 MG/1
50 TABLET ORAL EVERY 4 HOURS PRN
Status: CANCELLED | OUTPATIENT
Start: 2025-08-23

## 2025-08-23 RX ORDER — SODIUM CHLORIDE 9 MG/ML
INJECTION, SOLUTION INTRAVENOUS PRN
Status: DISCONTINUED | OUTPATIENT
Start: 2025-08-23 | End: 2025-08-23 | Stop reason: HOSPADM

## 2025-08-23 RX ORDER — HYDROMORPHONE HYDROCHLORIDE 1 MG/ML
0.25 INJECTION, SOLUTION INTRAMUSCULAR; INTRAVENOUS; SUBCUTANEOUS EVERY 5 MIN PRN
Status: DISCONTINUED | OUTPATIENT
Start: 2025-08-23 | End: 2025-08-23 | Stop reason: HOSPADM

## 2025-08-23 RX ORDER — SODIUM CHLORIDE 0.9 % (FLUSH) 0.9 %
5-40 SYRINGE (ML) INJECTION EVERY 12 HOURS SCHEDULED
Status: CANCELLED | OUTPATIENT
Start: 2025-08-23

## 2025-08-23 RX ORDER — TRAMADOL HYDROCHLORIDE 50 MG/1
50 TABLET ORAL EVERY 4 HOURS PRN
Status: DISCONTINUED | OUTPATIENT
Start: 2025-08-23 | End: 2025-08-25 | Stop reason: HOSPADM

## 2025-08-23 RX ORDER — ENOXAPARIN SODIUM 100 MG/ML
40 INJECTION SUBCUTANEOUS DAILY
Status: CANCELLED | OUTPATIENT
Start: 2025-08-23

## 2025-08-23 RX ORDER — SODIUM CHLORIDE, SODIUM LACTATE, POTASSIUM CHLORIDE, CALCIUM CHLORIDE 600; 310; 30; 20 MG/100ML; MG/100ML; MG/100ML; MG/100ML
INJECTION, SOLUTION INTRAVENOUS
Status: DISCONTINUED | OUTPATIENT
Start: 2025-08-23 | End: 2025-08-23 | Stop reason: SDUPTHER

## 2025-08-23 RX ORDER — DIPHENHYDRAMINE HYDROCHLORIDE 50 MG/ML
12.5 INJECTION, SOLUTION INTRAMUSCULAR; INTRAVENOUS
Status: DISCONTINUED | OUTPATIENT
Start: 2025-08-23 | End: 2025-08-23 | Stop reason: HOSPADM

## 2025-08-23 RX ORDER — HYDROMORPHONE HYDROCHLORIDE 1 MG/ML
0.5 INJECTION, SOLUTION INTRAMUSCULAR; INTRAVENOUS; SUBCUTANEOUS
Status: DISCONTINUED | OUTPATIENT
Start: 2025-08-23 | End: 2025-08-24

## 2025-08-23 RX ORDER — HYDROMORPHONE HYDROCHLORIDE 1 MG/ML
0.25 INJECTION, SOLUTION INTRAMUSCULAR; INTRAVENOUS; SUBCUTANEOUS
Status: DISCONTINUED | OUTPATIENT
Start: 2025-08-23 | End: 2025-08-24

## 2025-08-23 RX ORDER — ONDANSETRON 2 MG/ML
4 INJECTION INTRAMUSCULAR; INTRAVENOUS
Status: COMPLETED | OUTPATIENT
Start: 2025-08-23 | End: 2025-08-23

## 2025-08-23 RX ORDER — PHENYLEPHRINE HCL IN 0.9% NACL 1 MG/10 ML
SYRINGE (ML) INTRAVENOUS
Status: DISCONTINUED | OUTPATIENT
Start: 2025-08-23 | End: 2025-08-23 | Stop reason: SDUPTHER

## 2025-08-23 RX ORDER — DEXAMETHASONE SODIUM PHOSPHATE 4 MG/ML
INJECTION, SOLUTION INTRA-ARTICULAR; INTRALESIONAL; INTRAMUSCULAR; INTRAVENOUS; SOFT TISSUE
Status: DISCONTINUED | OUTPATIENT
Start: 2025-08-23 | End: 2025-08-23 | Stop reason: SDUPTHER

## 2025-08-23 RX ORDER — ENOXAPARIN SODIUM 100 MG/ML
40 INJECTION SUBCUTANEOUS EVERY EVENING
Status: DISCONTINUED | OUTPATIENT
Start: 2025-08-23 | End: 2025-08-25 | Stop reason: HOSPADM

## 2025-08-23 RX ORDER — ONDANSETRON 4 MG/1
4 TABLET, ORALLY DISINTEGRATING ORAL EVERY 8 HOURS PRN
Status: DISCONTINUED | OUTPATIENT
Start: 2025-08-23 | End: 2025-08-23 | Stop reason: SDUPTHER

## 2025-08-23 RX ORDER — SODIUM CHLORIDE 0.9 % (FLUSH) 0.9 %
5-40 SYRINGE (ML) INJECTION EVERY 12 HOURS SCHEDULED
Status: DISCONTINUED | OUTPATIENT
Start: 2025-08-23 | End: 2025-08-23 | Stop reason: HOSPADM

## 2025-08-23 RX ORDER — ACETAMINOPHEN 325 MG/1
650 TABLET ORAL EVERY 6 HOURS
Status: CANCELLED | OUTPATIENT
Start: 2025-08-23

## 2025-08-23 RX ORDER — HYDRALAZINE HYDROCHLORIDE 20 MG/ML
10 INJECTION INTRAMUSCULAR; INTRAVENOUS
Status: DISCONTINUED | OUTPATIENT
Start: 2025-08-23 | End: 2025-08-23 | Stop reason: HOSPADM

## 2025-08-23 RX ORDER — PROCHLORPERAZINE EDISYLATE 5 MG/ML
5 INJECTION INTRAMUSCULAR; INTRAVENOUS
Status: DISCONTINUED | OUTPATIENT
Start: 2025-08-23 | End: 2025-08-23 | Stop reason: HOSPADM

## 2025-08-23 RX ORDER — ROCURONIUM BROMIDE 10 MG/ML
INJECTION, SOLUTION INTRAVENOUS
Status: DISCONTINUED | OUTPATIENT
Start: 2025-08-23 | End: 2025-08-23 | Stop reason: SDUPTHER

## 2025-08-23 RX ORDER — MAGNESIUM HYDROXIDE 1200 MG/15ML
LIQUID ORAL CONTINUOUS PRN
Status: DISCONTINUED | OUTPATIENT
Start: 2025-08-23 | End: 2025-08-23 | Stop reason: HOSPADM

## 2025-08-23 RX ORDER — ONDANSETRON 4 MG/1
4 TABLET, ORALLY DISINTEGRATING ORAL EVERY 8 HOURS PRN
Status: CANCELLED | OUTPATIENT
Start: 2025-08-23

## 2025-08-23 RX ADMIN — ACETAMINOPHEN 1000 MG: 500 TABLET ORAL at 14:37

## 2025-08-23 RX ADMIN — WATER 2000 MG: 1 INJECTION INTRAMUSCULAR; INTRAVENOUS; SUBCUTANEOUS at 20:27

## 2025-08-23 RX ADMIN — CEFAZOLIN SODIUM 2 G: 1 POWDER, FOR SOLUTION INTRAMUSCULAR; INTRAVENOUS at 12:09

## 2025-08-23 RX ADMIN — ROCURONIUM BROMIDE 50 MG: 10 INJECTION, SOLUTION INTRAVENOUS at 11:55

## 2025-08-23 RX ADMIN — METHOCARBAMOL 750 MG: 750 TABLET ORAL at 20:27

## 2025-08-23 RX ADMIN — HYDROMORPHONE HYDROCHLORIDE 0.25 MG: 1 INJECTION, SOLUTION INTRAMUSCULAR; INTRAVENOUS; SUBCUTANEOUS at 14:40

## 2025-08-23 RX ADMIN — LIDOCAINE HYDROCHLORIDE 100 MG: 20 INJECTION, SOLUTION INFILTRATION; PERINEURAL at 11:55

## 2025-08-23 RX ADMIN — METHOCARBAMOL 500 MG: 100 INJECTION, SOLUTION INTRAMUSCULAR; INTRAVENOUS at 12:31

## 2025-08-23 RX ADMIN — METHOCARBAMOL 750 MG: 750 TABLET ORAL at 14:37

## 2025-08-23 RX ADMIN — SUGAMMADEX 200 MG: 100 INJECTION, SOLUTION INTRAVENOUS at 12:33

## 2025-08-23 RX ADMIN — SODIUM CHLORIDE, SODIUM LACTATE, POTASSIUM CHLORIDE, AND CALCIUM CHLORIDE: .6; .31; .03; .02 INJECTION, SOLUTION INTRAVENOUS at 11:51

## 2025-08-23 RX ADMIN — METHOCARBAMOL 500 MG: 100 INJECTION, SOLUTION INTRAMUSCULAR; INTRAVENOUS at 12:17

## 2025-08-23 RX ADMIN — Medication 100 MCG: at 12:09

## 2025-08-23 RX ADMIN — ENOXAPARIN SODIUM 40 MG: 100 INJECTION SUBCUTANEOUS at 19:23

## 2025-08-23 RX ADMIN — ACETAMINOPHEN 1000 MG: 500 TABLET ORAL at 20:27

## 2025-08-23 RX ADMIN — ONDANSETRON 4 MG: 2 INJECTION, SOLUTION INTRAMUSCULAR; INTRAVENOUS at 12:00

## 2025-08-23 RX ADMIN — ONDANSETRON 4 MG: 2 INJECTION, SOLUTION INTRAMUSCULAR; INTRAVENOUS at 12:59

## 2025-08-23 RX ADMIN — PROPOFOL 100 MG: 10 INJECTION, EMULSION INTRAVENOUS at 11:55

## 2025-08-23 RX ADMIN — SODIUM CHLORIDE, PRESERVATIVE FREE 10 ML: 5 INJECTION INTRAVENOUS at 20:27

## 2025-08-23 RX ADMIN — FENTANYL CITRATE 100 MCG: 50 INJECTION INTRAMUSCULAR; INTRAVENOUS at 11:53

## 2025-08-23 RX ADMIN — DEXAMETHASONE SODIUM PHOSPHATE 4 MG: 4 INJECTION INTRA-ARTICULAR; INTRALESIONAL; INTRAMUSCULAR; INTRAVENOUS; SOFT TISSUE at 12:00

## 2025-08-23 RX ADMIN — Medication 200 MCG: at 12:21

## 2025-08-23 RX ADMIN — METHOCARBAMOL 750 MG: 750 TABLET ORAL at 09:29

## 2025-08-23 ASSESSMENT — PAIN - FUNCTIONAL ASSESSMENT
PAIN_FUNCTIONAL_ASSESSMENT: PREVENTS OR INTERFERES SOME ACTIVE ACTIVITIES AND ADLS
PAIN_FUNCTIONAL_ASSESSMENT: 0-10

## 2025-08-23 ASSESSMENT — PAIN DESCRIPTION - ONSET: ONSET: ON-GOING

## 2025-08-23 ASSESSMENT — PAIN DESCRIPTION - FREQUENCY: FREQUENCY: CONTINUOUS

## 2025-08-23 ASSESSMENT — PAIN DESCRIPTION - PAIN TYPE: TYPE: SURGICAL PAIN

## 2025-08-23 ASSESSMENT — PAIN SCALES - GENERAL
PAINLEVEL_OUTOF10: 0
PAINLEVEL_OUTOF10: 4
PAINLEVEL_OUTOF10: 6

## 2025-08-23 ASSESSMENT — PAIN DESCRIPTION - LOCATION: LOCATION: HIP;LEG

## 2025-08-23 ASSESSMENT — PAIN DESCRIPTION - ORIENTATION: ORIENTATION: LEFT

## 2025-08-23 ASSESSMENT — LIFESTYLE VARIABLES: SMOKING_STATUS: 0

## 2025-08-23 ASSESSMENT — PAIN DESCRIPTION - DESCRIPTORS: DESCRIPTORS: SORE;DISCOMFORT

## 2025-08-24 LAB
ALBUMIN SERPL-MCNC: 3.6 G/DL (ref 3.4–5)
ANION GAP SERPL CALCULATED.3IONS-SCNC: 9 MMOL/L (ref 3–16)
BASOPHILS # BLD: 0 K/UL (ref 0–0.2)
BASOPHILS NFR BLD: 0.2 %
BUN SERPL-MCNC: 11 MG/DL (ref 7–20)
CALCIUM SERPL-MCNC: 8.7 MG/DL (ref 8.3–10.6)
CHLORIDE SERPL-SCNC: 101 MMOL/L (ref 99–110)
CO2 SERPL-SCNC: 24 MMOL/L (ref 21–32)
CREAT SERPL-MCNC: 0.6 MG/DL (ref 0.6–1.2)
DEPRECATED RDW RBC AUTO: 14.2 % (ref 12.4–15.4)
EOSINOPHIL # BLD: 0 K/UL (ref 0–0.6)
EOSINOPHIL NFR BLD: 0.2 %
GFR SERPLBLD CREATININE-BSD FMLA CKD-EPI: >90 ML/MIN/{1.73_M2}
GLUCOSE SERPL-MCNC: 117 MG/DL (ref 70–99)
HCT VFR BLD AUTO: 35.9 % (ref 36–48)
HGB BLD-MCNC: 12.6 G/DL (ref 12–16)
LYMPHOCYTES # BLD: 1 K/UL (ref 1–5.1)
LYMPHOCYTES NFR BLD: 9.2 %
MAGNESIUM SERPL-MCNC: 2.12 MG/DL (ref 1.8–2.4)
MCH RBC QN AUTO: 34.6 PG (ref 26–34)
MCHC RBC AUTO-ENTMCNC: 35.1 G/DL (ref 31–36)
MCV RBC AUTO: 98.7 FL (ref 80–100)
MONOCYTES # BLD: 0.6 K/UL (ref 0–1.3)
MONOCYTES NFR BLD: 5.4 %
NEUTROPHILS # BLD: 9.1 K/UL (ref 1.7–7.7)
NEUTROPHILS NFR BLD: 85 %
PHOSPHATE SERPL-MCNC: 4.2 MG/DL (ref 2.5–4.9)
PLATELET # BLD AUTO: 273 K/UL (ref 135–450)
PMV BLD AUTO: 7.5 FL (ref 5–10.5)
POTASSIUM SERPL-SCNC: 4.6 MMOL/L (ref 3.5–5.1)
RBC # BLD AUTO: 3.64 M/UL (ref 4–5.2)
SODIUM SERPL-SCNC: 134 MMOL/L (ref 136–145)
WBC # BLD AUTO: 10.7 K/UL (ref 4–11)

## 2025-08-24 PROCEDURE — 6370000000 HC RX 637 (ALT 250 FOR IP): Performed by: ORTHOPAEDIC SURGERY

## 2025-08-24 PROCEDURE — 2500000003 HC RX 250 WO HCPCS: Performed by: ORTHOPAEDIC SURGERY

## 2025-08-24 PROCEDURE — 6370000000 HC RX 637 (ALT 250 FOR IP): Performed by: HOSPITALIST

## 2025-08-24 PROCEDURE — 1200000000 HC SEMI PRIVATE

## 2025-08-24 PROCEDURE — 97162 PT EVAL MOD COMPLEX 30 MIN: CPT

## 2025-08-24 PROCEDURE — 6360000002 HC RX W HCPCS: Performed by: ORTHOPAEDIC SURGERY

## 2025-08-24 PROCEDURE — 97535 SELF CARE MNGMENT TRAINING: CPT

## 2025-08-24 PROCEDURE — 36415 COLL VENOUS BLD VENIPUNCTURE: CPT

## 2025-08-24 PROCEDURE — 97530 THERAPEUTIC ACTIVITIES: CPT

## 2025-08-24 PROCEDURE — 27235 TREAT THIGH FRACTURE: CPT | Performed by: ORTHOPAEDIC SURGERY

## 2025-08-24 PROCEDURE — 2500000003 HC RX 250 WO HCPCS

## 2025-08-24 PROCEDURE — 6370000000 HC RX 637 (ALT 250 FOR IP)

## 2025-08-24 PROCEDURE — 97166 OT EVAL MOD COMPLEX 45 MIN: CPT

## 2025-08-24 PROCEDURE — 85025 COMPLETE CBC W/AUTO DIFF WBC: CPT

## 2025-08-24 PROCEDURE — 99232 SBSQ HOSP IP/OBS MODERATE 35: CPT | Performed by: HOSPITALIST

## 2025-08-24 PROCEDURE — 97116 GAIT TRAINING THERAPY: CPT

## 2025-08-24 PROCEDURE — 80069 RENAL FUNCTION PANEL: CPT

## 2025-08-24 PROCEDURE — 83735 ASSAY OF MAGNESIUM: CPT

## 2025-08-24 RX ADMIN — METHOCARBAMOL 750 MG: 750 TABLET ORAL at 13:05

## 2025-08-24 RX ADMIN — ACETAMINOPHEN 1000 MG: 500 TABLET ORAL at 13:05

## 2025-08-24 RX ADMIN — WATER 2000 MG: 1 INJECTION INTRAMUSCULAR; INTRAVENOUS; SUBCUTANEOUS at 03:42

## 2025-08-24 RX ADMIN — METHOCARBAMOL 750 MG: 750 TABLET ORAL at 08:41

## 2025-08-24 RX ADMIN — SODIUM CHLORIDE, PRESERVATIVE FREE 10 ML: 5 INJECTION INTRAVENOUS at 08:44

## 2025-08-24 RX ADMIN — LEVOTHYROXINE SODIUM 75 MCG: 0.07 TABLET ORAL at 08:41

## 2025-08-24 RX ADMIN — TRAMADOL HYDROCHLORIDE 50 MG: 50 TABLET, COATED ORAL at 09:15

## 2025-08-24 RX ADMIN — TRAMADOL HYDROCHLORIDE 50 MG: 50 TABLET, COATED ORAL at 17:52

## 2025-08-24 RX ADMIN — ENOXAPARIN SODIUM 40 MG: 100 INJECTION SUBCUTANEOUS at 17:52

## 2025-08-24 RX ADMIN — METHOCARBAMOL 750 MG: 750 TABLET ORAL at 20:48

## 2025-08-24 RX ADMIN — ACETAMINOPHEN 1000 MG: 500 TABLET ORAL at 22:41

## 2025-08-24 RX ADMIN — SODIUM CHLORIDE, PRESERVATIVE FREE 10 ML: 5 INJECTION INTRAVENOUS at 08:45

## 2025-08-24 RX ADMIN — SODIUM CHLORIDE, PRESERVATIVE FREE 10 ML: 5 INJECTION INTRAVENOUS at 20:49

## 2025-08-24 ASSESSMENT — PAIN - FUNCTIONAL ASSESSMENT
PAIN_FUNCTIONAL_ASSESSMENT: 0-10
PAIN_FUNCTIONAL_ASSESSMENT: 0-10
PAIN_FUNCTIONAL_ASSESSMENT: ACTIVITIES ARE NOT PREVENTED
PAIN_FUNCTIONAL_ASSESSMENT: ACTIVITIES ARE NOT PREVENTED

## 2025-08-24 ASSESSMENT — PAIN SCALES - GENERAL
PAINLEVEL_OUTOF10: 4
PAINLEVEL_OUTOF10: 6
PAINLEVEL_OUTOF10: 2
PAINLEVEL_OUTOF10: 4

## 2025-08-24 ASSESSMENT — PAIN DESCRIPTION - FREQUENCY
FREQUENCY: CONTINUOUS
FREQUENCY: CONTINUOUS

## 2025-08-24 ASSESSMENT — PAIN DESCRIPTION - ONSET
ONSET: ON-GOING
ONSET: ON-GOING

## 2025-08-24 ASSESSMENT — PAIN DESCRIPTION - ORIENTATION
ORIENTATION: LEFT
ORIENTATION: LEFT

## 2025-08-24 ASSESSMENT — PAIN DESCRIPTION - PAIN TYPE
TYPE: SURGICAL PAIN
TYPE: SURGICAL PAIN

## 2025-08-24 ASSESSMENT — PAIN DESCRIPTION - LOCATION
LOCATION: HIP
LOCATION: HIP

## 2025-08-24 ASSESSMENT — PAIN DESCRIPTION - DESCRIPTORS
DESCRIPTORS: ACHING;SORE
DESCRIPTORS: ACHING;DISCOMFORT;SORE

## 2025-08-25 VITALS
RESPIRATION RATE: 16 BRPM | HEART RATE: 66 BPM | SYSTOLIC BLOOD PRESSURE: 116 MMHG | DIASTOLIC BLOOD PRESSURE: 67 MMHG | OXYGEN SATURATION: 98 % | HEIGHT: 61 IN | BODY MASS INDEX: 22.31 KG/M2 | WEIGHT: 118.2 LBS | TEMPERATURE: 98.2 F

## 2025-08-25 PROBLEM — M25.552 ACUTE HIP PAIN, LEFT: Status: ACTIVE | Noted: 2025-08-25

## 2025-08-25 LAB
ALBUMIN SERPL-MCNC: 3.6 G/DL (ref 3.4–5)
ANION GAP SERPL CALCULATED.3IONS-SCNC: 9 MMOL/L (ref 3–16)
BASOPHILS # BLD: 0 K/UL (ref 0–0.2)
BASOPHILS NFR BLD: 0.5 %
BUN SERPL-MCNC: 12 MG/DL (ref 7–20)
CALCIUM SERPL-MCNC: 8.5 MG/DL (ref 8.3–10.6)
CHLORIDE SERPL-SCNC: 102 MMOL/L (ref 99–110)
CO2 SERPL-SCNC: 25 MMOL/L (ref 21–32)
CREAT SERPL-MCNC: 0.5 MG/DL (ref 0.6–1.2)
DEPRECATED RDW RBC AUTO: 13.7 % (ref 12.4–15.4)
EOSINOPHIL # BLD: 0.1 K/UL (ref 0–0.6)
EOSINOPHIL NFR BLD: 2.2 %
GFR SERPLBLD CREATININE-BSD FMLA CKD-EPI: >90 ML/MIN/{1.73_M2}
GLUCOSE SERPL-MCNC: 90 MG/DL (ref 70–99)
HCT VFR BLD AUTO: 36.4 % (ref 36–48)
HGB BLD-MCNC: 12.5 G/DL (ref 12–16)
LYMPHOCYTES # BLD: 1.8 K/UL (ref 1–5.1)
LYMPHOCYTES NFR BLD: 29.4 %
MCH RBC QN AUTO: 33.6 PG (ref 26–34)
MCHC RBC AUTO-ENTMCNC: 34.3 G/DL (ref 31–36)
MCV RBC AUTO: 98 FL (ref 80–100)
MONOCYTES # BLD: 0.4 K/UL (ref 0–1.3)
MONOCYTES NFR BLD: 6.9 %
NEUTROPHILS # BLD: 3.7 K/UL (ref 1.7–7.7)
NEUTROPHILS NFR BLD: 61 %
PHOSPHATE SERPL-MCNC: 3.2 MG/DL (ref 2.5–4.9)
PLATELET # BLD AUTO: 273 K/UL (ref 135–450)
PMV BLD AUTO: 7.5 FL (ref 5–10.5)
POTASSIUM SERPL-SCNC: 4 MMOL/L (ref 3.5–5.1)
RBC # BLD AUTO: 3.71 M/UL (ref 4–5.2)
SODIUM SERPL-SCNC: 136 MMOL/L (ref 136–145)
WBC # BLD AUTO: 6 K/UL (ref 4–11)

## 2025-08-25 PROCEDURE — 2500000003 HC RX 250 WO HCPCS

## 2025-08-25 PROCEDURE — 85025 COMPLETE CBC W/AUTO DIFF WBC: CPT

## 2025-08-25 PROCEDURE — 97116 GAIT TRAINING THERAPY: CPT

## 2025-08-25 PROCEDURE — 36415 COLL VENOUS BLD VENIPUNCTURE: CPT

## 2025-08-25 PROCEDURE — 80069 RENAL FUNCTION PANEL: CPT

## 2025-08-25 PROCEDURE — 99239 HOSP IP/OBS DSCHRG MGMT >30: CPT | Performed by: HOSPITALIST

## 2025-08-25 PROCEDURE — 6370000000 HC RX 637 (ALT 250 FOR IP): Performed by: HOSPITALIST

## 2025-08-25 PROCEDURE — 6370000000 HC RX 637 (ALT 250 FOR IP)

## 2025-08-25 PROCEDURE — 2500000003 HC RX 250 WO HCPCS: Performed by: ORTHOPAEDIC SURGERY

## 2025-08-25 PROCEDURE — 97110 THERAPEUTIC EXERCISES: CPT

## 2025-08-25 RX ORDER — ASPIRIN 81 MG/1
81 TABLET ORAL 2 TIMES DAILY
Qty: 60 TABLET | Refills: 0 | Status: SHIPPED | OUTPATIENT
Start: 2025-08-25 | End: 2025-09-24

## 2025-08-25 RX ORDER — TRAMADOL HYDROCHLORIDE 50 MG/1
50 TABLET ORAL EVERY 6 HOURS PRN
Qty: 20 TABLET | Refills: 0 | Status: SHIPPED | OUTPATIENT
Start: 2025-08-25 | End: 2025-08-30

## 2025-08-25 RX ADMIN — ACETAMINOPHEN 1000 MG: 500 TABLET ORAL at 06:47

## 2025-08-25 RX ADMIN — SODIUM CHLORIDE, PRESERVATIVE FREE 10 ML: 5 INJECTION INTRAVENOUS at 08:12

## 2025-08-25 RX ADMIN — LEVOTHYROXINE SODIUM 75 MCG: 0.07 TABLET ORAL at 08:11

## 2025-08-25 RX ADMIN — METHOCARBAMOL 750 MG: 750 TABLET ORAL at 08:11

## 2025-08-25 ASSESSMENT — ENCOUNTER SYMPTOMS
ALLERGIC/IMMUNOLOGIC NEGATIVE: 1
GASTROINTESTINAL NEGATIVE: 1
RESPIRATORY NEGATIVE: 1
EYES NEGATIVE: 1

## 2025-08-25 ASSESSMENT — PAIN SCALES - GENERAL: PAINLEVEL_OUTOF10: 0

## 2025-08-26 ENCOUNTER — CARE COORDINATION (OUTPATIENT)
Dept: CARE COORDINATION | Age: 80
End: 2025-08-26
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
## 2025-08-26 DIAGNOSIS — M25.552 ACUTE HIP PAIN, LEFT: ICD-10-CM

## 2025-09-02 ENCOUNTER — OFFICE VISIT (OUTPATIENT)
Dept: FAMILY MEDICINE CLINIC | Age: 80
End: 2025-09-02

## 2025-09-02 VITALS
HEART RATE: 68 BPM | TEMPERATURE: 98.5 F | WEIGHT: 116 LBS | SYSTOLIC BLOOD PRESSURE: 110 MMHG | OXYGEN SATURATION: 98 % | DIASTOLIC BLOOD PRESSURE: 80 MMHG | BODY MASS INDEX: 21.9 KG/M2 | HEIGHT: 61 IN

## 2025-09-02 DIAGNOSIS — S72.002D CLOSED FRACTURE OF NECK OF LEFT FEMUR WITH ROUTINE HEALING, SUBSEQUENT ENCOUNTER: ICD-10-CM

## 2025-09-02 DIAGNOSIS — H25.9 AGE-RELATED CATARACT OF BOTH EYES, UNSPECIFIED AGE-RELATED CATARACT TYPE: ICD-10-CM

## 2025-09-02 DIAGNOSIS — Z09 HOSPITAL DISCHARGE FOLLOW-UP: Primary | ICD-10-CM

## 2025-09-02 DIAGNOSIS — Z01.810 PREOP CARDIOVASCULAR EXAM: ICD-10-CM

## 2025-09-03 ENCOUNTER — CARE COORDINATION (OUTPATIENT)
Dept: CARE COORDINATION | Age: 80
End: 2025-09-03

## (undated) DEVICE — SOLUTION IV 1000ML 0.9% SOD CHL

## (undated) DEVICE — COVER MAYOSTAND XL W30XL57IN POLYPR DISP

## (undated) DEVICE — DRESSING TRNSPAR W4XL4.75IN STD FRME STYL WTRPRF BARR

## (undated) DEVICE — STAPLER SKIN H3.9MM WIRE DIA0.58MM CRWN 6.9MM 35 STPL ROT

## (undated) DEVICE — SUTURE VICRYL SZ 2-0 L18IN ABSRB UD CT-1 L36MM 1/2 CIR J839D

## (undated) DEVICE — GLOVE SURG SZ 75 L12IN FNGR THK79MIL GRN LTX FREE

## (undated) DEVICE — DRAPE SURG L W129XL100IN INCIS W27XL12IN ISOLATN W/ IOBAN 2

## (undated) DEVICE — GLOVE ORANGE PI 7 1/2   MSG9075

## (undated) DEVICE — SPONGE GZ W4XL8IN COT WVN 12 PLY

## (undated) DEVICE — TRAP FLUID

## (undated) DEVICE — PROTECTOR ULN NRV PUR FOAM HK LOOP STRP ANATOMICALLY

## (undated) DEVICE — SPONGE GZ W4XL4IN COT 12 PLY TYP VII WVN C FLD DSGN STERILE

## (undated) DEVICE — PAD ABD W5XL9IN GEN USE NONADHESIVE EXTRA ABSRB SFT

## (undated) DEVICE — BLADE SURG 10 TWIN BK CARBON STL STRL CISION LF DISP

## (undated) DEVICE — BANDAGE COMPR W6INXL4.5YD LTWT E EC SGL LAYERED CLP CLSR

## (undated) DEVICE — STAPLER SKIN H3.9MM WIRE DIA0.58MM CRWN 6.9MM 35 STPL FIX

## (undated) DEVICE — Device

## (undated) DEVICE — GUIDEWIRE ORTH L300MM DIA2.8MM S STL THRD SHRP TRCR TIP FOR

## (undated) DEVICE — TOWEL MSG G N WVN STP FLAG

## (undated) DEVICE — SUTURE VICRYL + SZ 1 L18IN ABSRB UD L36MM CT-1 1/2 CIR VCP841D

## (undated) DEVICE — BIT DRL L300MM DIA5MM CANN QUIK CPL ADJ STP REUSE

## (undated) DEVICE — TOWEL SURG W17XL27IN BLU COT DLX PREWASHED DELINTED 8 PER PK

## (undated) DEVICE — PAD N ADH W3XL8IN POLY COT SFT PERF FLM EASILY CUT ABSRB

## (undated) DEVICE — DRESSING PETRO W1XL8IN 3% BISMUTH TRIBROMOPHENATE XRFRM